# Patient Record
Sex: MALE | Race: BLACK OR AFRICAN AMERICAN | NOT HISPANIC OR LATINO
[De-identification: names, ages, dates, MRNs, and addresses within clinical notes are randomized per-mention and may not be internally consistent; named-entity substitution may affect disease eponyms.]

---

## 2017-02-27 ENCOUNTER — APPOINTMENT (OUTPATIENT)
Dept: ORTHOPEDIC SURGERY | Facility: CLINIC | Age: 62
End: 2017-02-27

## 2017-02-27 VITALS
DIASTOLIC BLOOD PRESSURE: 99 MMHG | BODY MASS INDEX: 28.44 KG/M2 | HEIGHT: 72 IN | WEIGHT: 210 LBS | SYSTOLIC BLOOD PRESSURE: 163 MMHG | HEART RATE: 60 BPM

## 2017-02-27 DIAGNOSIS — Z78.9 OTHER SPECIFIED HEALTH STATUS: ICD-10-CM

## 2017-02-27 RX ORDER — IBUPROFEN 800 MG/1
TABLET, FILM COATED ORAL
Refills: 0 | Status: ACTIVE | COMMUNITY

## 2017-02-27 RX ORDER — ASPIRIN 325 MG/1
TABLET, FILM COATED ORAL
Refills: 0 | Status: ACTIVE | COMMUNITY

## 2017-02-27 RX ORDER — NAPROXEN 500 MG/1
TABLET ORAL
Refills: 0 | Status: ACTIVE | COMMUNITY

## 2017-03-13 ENCOUNTER — APPOINTMENT (OUTPATIENT)
Dept: ORTHOPEDIC SURGERY | Facility: CLINIC | Age: 62
End: 2017-03-13

## 2017-03-13 DIAGNOSIS — S82.832A OTHER FRACTURE OF UPPER AND LOWER END OF LEFT FIBULA, INITIAL ENCOUNTER FOR CLOSED FRACTURE: ICD-10-CM

## 2017-04-11 ENCOUNTER — APPOINTMENT (OUTPATIENT)
Dept: ORTHOPEDIC SURGERY | Facility: CLINIC | Age: 62
End: 2017-04-11

## 2017-05-09 ENCOUNTER — APPOINTMENT (OUTPATIENT)
Dept: ORTHOPEDIC SURGERY | Facility: CLINIC | Age: 62
End: 2017-05-09

## 2017-08-08 ENCOUNTER — APPOINTMENT (OUTPATIENT)
Dept: ORTHOPEDIC SURGERY | Facility: CLINIC | Age: 62
End: 2017-08-08
Payer: COMMERCIAL

## 2017-08-08 PROCEDURE — 99214 OFFICE O/P EST MOD 30 MIN: CPT

## 2017-08-08 PROCEDURE — 73610 X-RAY EXAM OF ANKLE: CPT | Mod: LT

## 2017-08-08 PROCEDURE — 73630 X-RAY EXAM OF FOOT: CPT | Mod: RT

## 2018-02-12 ENCOUNTER — APPOINTMENT (OUTPATIENT)
Dept: ORTHOPEDIC SURGERY | Facility: CLINIC | Age: 63
End: 2018-02-12
Payer: COMMERCIAL

## 2018-02-12 DIAGNOSIS — S82.832D OTHER FRACTURE OF UPPER AND LOWER END OF LEFT FIBULA, SUBSEQUENT ENCOUNTER FOR CLOSED FRACTURE WITH ROUTINE HEALING: ICD-10-CM

## 2018-02-12 PROCEDURE — 73630 X-RAY EXAM OF FOOT: CPT | Mod: RT

## 2018-02-12 PROCEDURE — 99214 OFFICE O/P EST MOD 30 MIN: CPT

## 2018-02-12 PROCEDURE — 73610 X-RAY EXAM OF ANKLE: CPT | Mod: LT

## 2018-03-26 ENCOUNTER — APPOINTMENT (OUTPATIENT)
Dept: ORTHOPEDIC SURGERY | Facility: CLINIC | Age: 63
End: 2018-03-26
Payer: COMMERCIAL

## 2018-04-13 ENCOUNTER — APPOINTMENT (OUTPATIENT)
Dept: ORTHOPEDIC SURGERY | Facility: CLINIC | Age: 63
End: 2018-04-13
Payer: COMMERCIAL

## 2018-04-13 ENCOUNTER — RX RENEWAL (OUTPATIENT)
Age: 63
End: 2018-04-13

## 2018-04-13 PROCEDURE — 99214 OFFICE O/P EST MOD 30 MIN: CPT

## 2018-04-13 PROCEDURE — 73630 X-RAY EXAM OF FOOT: CPT | Mod: RT

## 2018-04-13 RX ORDER — METHYLPREDNISOLONE 4 MG/1
4 TABLET ORAL
Qty: 1 | Refills: 0 | Status: ACTIVE | COMMUNITY
Start: 2018-04-13 | End: 1900-01-01

## 2018-04-23 ENCOUNTER — FORM ENCOUNTER (OUTPATIENT)
Age: 63
End: 2018-04-23

## 2018-04-24 ENCOUNTER — APPOINTMENT (OUTPATIENT)
Dept: MRI IMAGING | Facility: IMAGING CENTER | Age: 63
End: 2018-04-24
Payer: COMMERCIAL

## 2018-04-24 ENCOUNTER — OUTPATIENT (OUTPATIENT)
Dept: OUTPATIENT SERVICES | Facility: HOSPITAL | Age: 63
LOS: 1 days | End: 2018-04-24
Payer: COMMERCIAL

## 2018-04-24 DIAGNOSIS — M20.21 HALLUX RIGIDUS, RIGHT FOOT: ICD-10-CM

## 2018-04-24 DIAGNOSIS — M20.12 HALLUX VALGUS (ACQUIRED), LEFT FOOT: Chronic | ICD-10-CM

## 2018-04-24 PROCEDURE — 73718 MRI LOWER EXTREMITY W/O DYE: CPT

## 2018-04-24 PROCEDURE — 73718 MRI LOWER EXTREMITY W/O DYE: CPT | Mod: 26,RT

## 2018-04-26 ENCOUNTER — APPOINTMENT (OUTPATIENT)
Dept: ORTHOPEDIC SURGERY | Facility: CLINIC | Age: 63
End: 2018-04-26
Payer: COMMERCIAL

## 2018-04-26 PROCEDURE — 99214 OFFICE O/P EST MOD 30 MIN: CPT

## 2018-04-26 RX ORDER — TADALAFIL 10 MG/1
10 TABLET, FILM COATED ORAL
Qty: 12 | Refills: 0 | Status: ACTIVE | COMMUNITY
Start: 2017-12-22

## 2018-04-26 RX ORDER — SILDENAFIL 100 MG/1
100 TABLET, FILM COATED ORAL
Qty: 6 | Refills: 0 | Status: ACTIVE | COMMUNITY
Start: 2017-12-22

## 2018-06-01 ENCOUNTER — RX RENEWAL (OUTPATIENT)
Age: 63
End: 2018-06-01

## 2018-07-24 ENCOUNTER — APPOINTMENT (OUTPATIENT)
Dept: RHEUMATOLOGY | Facility: CLINIC | Age: 63
End: 2018-07-24
Payer: COMMERCIAL

## 2018-07-24 VITALS
RESPIRATION RATE: 16 BRPM | DIASTOLIC BLOOD PRESSURE: 82 MMHG | TEMPERATURE: 98 F | OXYGEN SATURATION: 97 % | WEIGHT: 217 LBS | BODY MASS INDEX: 29.39 KG/M2 | SYSTOLIC BLOOD PRESSURE: 126 MMHG | HEIGHT: 72 IN | HEART RATE: 64 BPM

## 2018-07-24 LAB
BASOPHILS # BLD AUTO: 0.02 K/UL
BASOPHILS NFR BLD AUTO: 0.3 %
EOSINOPHIL # BLD AUTO: 0.1 K/UL
EOSINOPHIL NFR BLD AUTO: 1.3 %
HCT VFR BLD CALC: 44.6 %
HGB BLD-MCNC: 14.9 G/DL
IMM GRANULOCYTES NFR BLD AUTO: 0.5 %
LYMPHOCYTES # BLD AUTO: 1.97 K/UL
LYMPHOCYTES NFR BLD AUTO: 24.6 %
MAN DIFF?: NORMAL
MCHC RBC-ENTMCNC: 33.4 GM/DL
MCHC RBC-ENTMCNC: 34.2 PG
MCV RBC AUTO: 102.3 FL
MONOCYTES # BLD AUTO: 0.56 K/UL
MONOCYTES NFR BLD AUTO: 7 %
NEUTROPHILS # BLD AUTO: 5.31 K/UL
NEUTROPHILS NFR BLD AUTO: 66.3 %
PLATELET # BLD AUTO: 245 K/UL
RBC # BLD: 4.36 M/UL
RBC # FLD: 12.8 %
WBC # FLD AUTO: 8 K/UL

## 2018-07-24 PROCEDURE — 99204 OFFICE O/P NEW MOD 45 MIN: CPT

## 2018-07-25 LAB
ALBUMIN SERPL ELPH-MCNC: 4.5 G/DL
ALP BLD-CCNC: 63 U/L
ALT SERPL-CCNC: 25 U/L
ANION GAP SERPL CALC-SCNC: 14 MMOL/L
AST SERPL-CCNC: 25 U/L
BILIRUB SERPL-MCNC: 0.4 MG/DL
BUN SERPL-MCNC: 13 MG/DL
CALCIUM SERPL-MCNC: 9.5 MG/DL
CCP AB SER IA-ACNC: <8 UNITS
CHLORIDE SERPL-SCNC: 105 MMOL/L
CO2 SERPL-SCNC: 25 MMOL/L
CREAT SERPL-MCNC: 0.97 MG/DL
CRP SERPL-MCNC: 0.25 MG/DL
ERYTHROCYTE [SEDIMENTATION RATE] IN BLOOD BY WESTERGREN METHOD: 12 MM/HR
GLUCOSE SERPL-MCNC: 96 MG/DL
POTASSIUM SERPL-SCNC: 4.5 MMOL/L
PROT SERPL-MCNC: 7 G/DL
RF+CCP IGG SER-IMP: NEGATIVE
RHEUMATOID FACT SER QL: 11 IU/ML
SODIUM SERPL-SCNC: 144 MMOL/L
URATE SERPL-MCNC: 8.1 MG/DL

## 2018-08-01 ENCOUNTER — APPOINTMENT (OUTPATIENT)
Dept: RHEUMATOLOGY | Facility: CLINIC | Age: 63
End: 2018-08-01

## 2018-08-09 ENCOUNTER — APPOINTMENT (OUTPATIENT)
Dept: ORTHOPEDIC SURGERY | Facility: CLINIC | Age: 63
End: 2018-08-09
Payer: COMMERCIAL

## 2018-08-09 DIAGNOSIS — M25.571 PAIN IN RIGHT ANKLE AND JOINTS OF RIGHT FOOT: ICD-10-CM

## 2018-08-09 DIAGNOSIS — M70.61 TROCHANTERIC BURSITIS, RIGHT HIP: ICD-10-CM

## 2018-08-09 DIAGNOSIS — M25.551 PAIN IN RIGHT HIP: ICD-10-CM

## 2018-08-09 PROCEDURE — 99214 OFFICE O/P EST MOD 30 MIN: CPT

## 2018-08-09 RX ORDER — THIAMINE HCL 100 MG
500 TABLET ORAL
Refills: 0 | Status: ACTIVE | COMMUNITY

## 2018-09-20 ENCOUNTER — APPOINTMENT (OUTPATIENT)
Dept: RHEUMATOLOGY | Facility: CLINIC | Age: 63
End: 2018-09-20
Payer: COMMERCIAL

## 2018-09-20 VITALS
SYSTOLIC BLOOD PRESSURE: 115 MMHG | HEART RATE: 63 BPM | WEIGHT: 219 LBS | DIASTOLIC BLOOD PRESSURE: 77 MMHG | HEIGHT: 72 IN | BODY MASS INDEX: 29.66 KG/M2 | TEMPERATURE: 98.2 F | OXYGEN SATURATION: 98 %

## 2018-09-20 DIAGNOSIS — M25.552 PAIN IN RIGHT HIP: ICD-10-CM

## 2018-09-20 DIAGNOSIS — M54.5 LOW BACK PAIN: ICD-10-CM

## 2018-09-20 DIAGNOSIS — M25.551 PAIN IN RIGHT HIP: ICD-10-CM

## 2018-09-20 PROCEDURE — 99214 OFFICE O/P EST MOD 30 MIN: CPT

## 2018-09-21 LAB
ALBUMIN SERPL ELPH-MCNC: 4.5 G/DL
ALP BLD-CCNC: 63 U/L
ALT SERPL-CCNC: 30 U/L
ANION GAP SERPL CALC-SCNC: 14 MMOL/L
AST SERPL-CCNC: 25 U/L
BASOPHILS # BLD AUTO: 0.02 K/UL
BASOPHILS NFR BLD AUTO: 0.2 %
BILIRUB SERPL-MCNC: 0.6 MG/DL
BUN SERPL-MCNC: 11 MG/DL
CALCIUM SERPL-MCNC: 9.9 MG/DL
CHLORIDE SERPL-SCNC: 105 MMOL/L
CO2 SERPL-SCNC: 23 MMOL/L
CREAT SERPL-MCNC: 1.05 MG/DL
EOSINOPHIL # BLD AUTO: 0.08 K/UL
EOSINOPHIL NFR BLD AUTO: 0.9 %
GLUCOSE SERPL-MCNC: 83 MG/DL
HCT VFR BLD CALC: 44.7 %
HGB BLD-MCNC: 14.7 G/DL
IMM GRANULOCYTES NFR BLD AUTO: 0.3 %
LYMPHOCYTES # BLD AUTO: 2.22 K/UL
LYMPHOCYTES NFR BLD AUTO: 25.6 %
MAN DIFF?: NORMAL
MCHC RBC-ENTMCNC: 32.9 GM/DL
MCHC RBC-ENTMCNC: 34.3 PG
MCV RBC AUTO: 104.4 FL
MONOCYTES # BLD AUTO: 0.51 K/UL
MONOCYTES NFR BLD AUTO: 5.9 %
NEUTROPHILS # BLD AUTO: 5.81 K/UL
NEUTROPHILS NFR BLD AUTO: 67.1 %
PLATELET # BLD AUTO: 269 K/UL
POTASSIUM SERPL-SCNC: 4.5 MMOL/L
PROT SERPL-MCNC: 7.2 G/DL
RBC # BLD: 4.28 M/UL
RBC # FLD: 12.5 %
SODIUM SERPL-SCNC: 142 MMOL/L
URATE SERPL-MCNC: 4.3 MG/DL
WBC # FLD AUTO: 8.67 K/UL

## 2018-10-11 ENCOUNTER — RX RENEWAL (OUTPATIENT)
Age: 63
End: 2018-10-11

## 2018-10-18 PROBLEM — M25.551 PAIN OF BOTH HIP JOINTS: Status: ACTIVE | Noted: 2018-09-20

## 2018-10-29 ENCOUNTER — FORM ENCOUNTER (OUTPATIENT)
Age: 63
End: 2018-10-29

## 2018-10-30 ENCOUNTER — APPOINTMENT (OUTPATIENT)
Dept: RADIOLOGY | Facility: CLINIC | Age: 63
End: 2018-10-30
Payer: COMMERCIAL

## 2018-10-30 ENCOUNTER — OUTPATIENT (OUTPATIENT)
Dept: OUTPATIENT SERVICES | Facility: HOSPITAL | Age: 63
LOS: 1 days | End: 2018-10-30
Payer: COMMERCIAL

## 2018-10-30 DIAGNOSIS — M20.12 HALLUX VALGUS (ACQUIRED), LEFT FOOT: Chronic | ICD-10-CM

## 2018-10-30 DIAGNOSIS — Z00.8 ENCOUNTER FOR OTHER GENERAL EXAMINATION: ICD-10-CM

## 2018-10-30 PROCEDURE — 73522 X-RAY EXAM HIPS BI 3-4 VIEWS: CPT | Mod: 26

## 2018-10-30 PROCEDURE — 72110 X-RAY EXAM L-2 SPINE 4/>VWS: CPT | Mod: 26

## 2018-10-30 PROCEDURE — 73110 X-RAY EXAM OF WRIST: CPT | Mod: 26,50

## 2018-10-30 PROCEDURE — 73110 X-RAY EXAM OF WRIST: CPT

## 2018-10-30 PROCEDURE — 73630 X-RAY EXAM OF FOOT: CPT | Mod: 26,50

## 2018-10-30 PROCEDURE — 73562 X-RAY EXAM OF KNEE 3: CPT | Mod: 26,50

## 2018-10-30 PROCEDURE — 73630 X-RAY EXAM OF FOOT: CPT

## 2018-10-30 PROCEDURE — 73130 X-RAY EXAM OF HAND: CPT | Mod: 26,50

## 2018-10-30 PROCEDURE — 72110 X-RAY EXAM L-2 SPINE 4/>VWS: CPT

## 2018-10-30 PROCEDURE — 73130 X-RAY EXAM OF HAND: CPT

## 2018-10-30 PROCEDURE — 73522 X-RAY EXAM HIPS BI 3-4 VIEWS: CPT

## 2018-10-30 PROCEDURE — 73562 X-RAY EXAM OF KNEE 3: CPT

## 2018-11-01 ENCOUNTER — APPOINTMENT (OUTPATIENT)
Dept: ORTHOPEDIC SURGERY | Facility: CLINIC | Age: 63
End: 2018-11-01
Payer: COMMERCIAL

## 2018-11-01 ENCOUNTER — TRANSCRIPTION ENCOUNTER (OUTPATIENT)
Age: 63
End: 2018-11-01

## 2018-11-01 DIAGNOSIS — M25.60 STIFFNESS OF UNSPECIFIED JOINT, NOT ELSEWHERE CLASSIFIED: ICD-10-CM

## 2018-11-01 DIAGNOSIS — M20.21 HALLUX RIGIDUS, RIGHT FOOT: ICD-10-CM

## 2018-11-01 DIAGNOSIS — M25.50 PAIN IN UNSPECIFIED JOINT: ICD-10-CM

## 2018-11-01 PROCEDURE — 99214 OFFICE O/P EST MOD 30 MIN: CPT

## 2018-11-28 ENCOUNTER — APPOINTMENT (OUTPATIENT)
Dept: RHEUMATOLOGY | Facility: CLINIC | Age: 63
End: 2018-11-28
Payer: COMMERCIAL

## 2018-11-28 VITALS
DIASTOLIC BLOOD PRESSURE: 88 MMHG | HEIGHT: 72 IN | OXYGEN SATURATION: 97 % | HEART RATE: 80 BPM | TEMPERATURE: 98.1 F | SYSTOLIC BLOOD PRESSURE: 131 MMHG | WEIGHT: 222 LBS | BODY MASS INDEX: 30.07 KG/M2

## 2018-11-28 DIAGNOSIS — M47.816 SPONDYLOSIS W/OUT MYELOPATHY OR RADICULOPATHY, LUMBAR REGION: ICD-10-CM

## 2018-11-28 DIAGNOSIS — M1A.0710 IDIOPATHIC CHRONIC GOUT, RIGHT ANKLE AND FOOT, W/OUT TOPHUS (TOPHI): ICD-10-CM

## 2018-11-28 DIAGNOSIS — M19.071 PRIMARY OSTEOARTHRITIS, RIGHT ANKLE AND FOOT: ICD-10-CM

## 2018-11-28 PROCEDURE — 99214 OFFICE O/P EST MOD 30 MIN: CPT

## 2019-03-04 ENCOUNTER — APPOINTMENT (OUTPATIENT)
Dept: RHEUMATOLOGY | Facility: CLINIC | Age: 64
End: 2019-03-04

## 2019-03-15 ENCOUNTER — TRANSCRIPTION ENCOUNTER (OUTPATIENT)
Age: 64
End: 2019-03-15

## 2019-03-28 ENCOUNTER — APPOINTMENT (OUTPATIENT)
Dept: RHEUMATOLOGY | Facility: CLINIC | Age: 64
End: 2019-03-28

## 2019-06-03 ENCOUNTER — LABORATORY RESULT (OUTPATIENT)
Age: 64
End: 2019-06-03

## 2019-06-03 ENCOUNTER — APPOINTMENT (OUTPATIENT)
Dept: RHEUMATOLOGY | Facility: CLINIC | Age: 64
End: 2019-06-03
Payer: COMMERCIAL

## 2019-06-03 VITALS
OXYGEN SATURATION: 97 % | BODY MASS INDEX: 29.93 KG/M2 | DIASTOLIC BLOOD PRESSURE: 85 MMHG | HEART RATE: 60 BPM | SYSTOLIC BLOOD PRESSURE: 129 MMHG | HEIGHT: 72 IN | WEIGHT: 221 LBS | TEMPERATURE: 98.1 F

## 2019-06-03 DIAGNOSIS — R20.2 PARESTHESIA OF SKIN: ICD-10-CM

## 2019-06-03 LAB
ALBUMIN SERPL ELPH-MCNC: 4.5 G/DL
ALP BLD-CCNC: 68 U/L
ALT SERPL-CCNC: 24 U/L
ANION GAP SERPL CALC-SCNC: 11 MMOL/L
AST SERPL-CCNC: 22 U/L
BASOPHILS # BLD AUTO: 0.03 K/UL
BASOPHILS NFR BLD AUTO: 0.3 %
BILIRUB SERPL-MCNC: 0.4 MG/DL
BUN SERPL-MCNC: 14 MG/DL
CALCIUM SERPL-MCNC: 9.7 MG/DL
CHLORIDE SERPL-SCNC: 106 MMOL/L
CO2 SERPL-SCNC: 25 MMOL/L
CREAT SERPL-MCNC: 1.13 MG/DL
EOSINOPHIL # BLD AUTO: 0.13 K/UL
EOSINOPHIL NFR BLD AUTO: 1.4 %
GLUCOSE SERPL-MCNC: 86 MG/DL
HCT VFR BLD CALC: 46.2 %
HGB BLD-MCNC: 15 G/DL
IMM GRANULOCYTES NFR BLD AUTO: 0.4 %
LYMPHOCYTES # BLD AUTO: 2.58 K/UL
LYMPHOCYTES NFR BLD AUTO: 28.1 %
MAN DIFF?: NORMAL
MCHC RBC-ENTMCNC: 32.5 GM/DL
MCHC RBC-ENTMCNC: 34.6 PG
MCV RBC AUTO: 106.5 FL
MONOCYTES # BLD AUTO: 0.66 K/UL
MONOCYTES NFR BLD AUTO: 7.2 %
NEUTROPHILS # BLD AUTO: 5.73 K/UL
NEUTROPHILS NFR BLD AUTO: 62.6 %
PLATELET # BLD AUTO: 234 K/UL
POTASSIUM SERPL-SCNC: 4.5 MMOL/L
PROT SERPL-MCNC: 7.1 G/DL
RBC # BLD: 4.34 M/UL
RBC # FLD: 12 %
SODIUM SERPL-SCNC: 142 MMOL/L
URATE SERPL-MCNC: 4.1 MG/DL
WBC # FLD AUTO: 9.17 K/UL

## 2019-06-03 PROCEDURE — 99213 OFFICE O/P EST LOW 20 MIN: CPT

## 2019-06-03 RX ORDER — ALLOPURINOL 300 MG/1
300 TABLET ORAL DAILY
Qty: 30 | Refills: 2 | Status: DISCONTINUED | COMMUNITY
Start: 2018-04-26 | End: 2019-06-03

## 2019-07-23 ENCOUNTER — FORM ENCOUNTER (OUTPATIENT)
Age: 64
End: 2019-07-23

## 2019-07-24 ENCOUNTER — APPOINTMENT (OUTPATIENT)
Dept: MRI IMAGING | Facility: CLINIC | Age: 64
End: 2019-07-24
Payer: COMMERCIAL

## 2019-07-24 ENCOUNTER — OUTPATIENT (OUTPATIENT)
Dept: OUTPATIENT SERVICES | Facility: HOSPITAL | Age: 64
LOS: 1 days | End: 2019-07-24
Payer: COMMERCIAL

## 2019-07-24 DIAGNOSIS — M20.12 HALLUX VALGUS (ACQUIRED), LEFT FOOT: Chronic | ICD-10-CM

## 2019-07-24 DIAGNOSIS — R20.2 PARESTHESIA OF SKIN: ICD-10-CM

## 2019-07-24 PROCEDURE — 72148 MRI LUMBAR SPINE W/O DYE: CPT

## 2019-07-24 PROCEDURE — 72148 MRI LUMBAR SPINE W/O DYE: CPT | Mod: 26

## 2019-07-24 PROCEDURE — 72141 MRI NECK SPINE W/O DYE: CPT | Mod: 26

## 2019-07-24 PROCEDURE — 72141 MRI NECK SPINE W/O DYE: CPT

## 2019-07-29 ENCOUNTER — APPOINTMENT (OUTPATIENT)
Dept: RHEUMATOLOGY | Facility: CLINIC | Age: 64
End: 2019-07-29
Payer: COMMERCIAL

## 2019-07-29 ENCOUNTER — LABORATORY RESULT (OUTPATIENT)
Age: 64
End: 2019-07-29

## 2019-07-29 ENCOUNTER — APPOINTMENT (OUTPATIENT)
Dept: ENDOCRINOLOGY | Facility: CLINIC | Age: 64
End: 2019-07-29
Payer: COMMERCIAL

## 2019-07-29 VITALS
BODY MASS INDEX: 29.84 KG/M2 | SYSTOLIC BLOOD PRESSURE: 132 MMHG | HEART RATE: 62 BPM | OXYGEN SATURATION: 98 % | DIASTOLIC BLOOD PRESSURE: 92 MMHG | TEMPERATURE: 98.1 F | WEIGHT: 220 LBS

## 2019-07-29 VITALS — BODY MASS INDEX: 29.63 KG/M2 | HEIGHT: 71.25 IN | WEIGHT: 214 LBS

## 2019-07-29 DIAGNOSIS — Z13.820 ENCOUNTER FOR SCREENING FOR OSTEOPOROSIS: ICD-10-CM

## 2019-07-29 PROCEDURE — 99214 OFFICE O/P EST MOD 30 MIN: CPT

## 2019-07-29 PROCEDURE — 77080 DXA BONE DENSITY AXIAL: CPT

## 2019-08-01 PROBLEM — Z13.820 OSTEOPOROSIS SCREENING: Status: ACTIVE | Noted: 2019-08-01

## 2019-08-01 LAB
ALBUMIN MFR SERPL ELPH: 63.3 %
ALBUMIN SERPL-MCNC: 4.6 G/DL
ALBUMIN/GLOB SERPL: 1.7 RATIO
ALBUPE: 23.6 %
ALBUPE: 23.6 %
ALPHA1 GLOB MFR SERPL ELPH: 3.6 %
ALPHA1 GLOB SERPL ELPH-MCNC: 0.3 G/DL
ALPHA1UPE: 35.7 %
ALPHA1UPE: 35.7 %
ALPHA2 GLOB MFR SERPL ELPH: 7.4 %
ALPHA2 GLOB SERPL ELPH-MCNC: 0.5 G/DL
ALPHA2UPE: 19.2 %
ALPHA2UPE: 19.2 %
B-GLOBULIN MFR SERPL ELPH: 11.7 %
B-GLOBULIN SERPL ELPH-MCNC: 0.9 G/DL
BETAUPE: 9.9 %
BETAUPE: 9.9 %
CREAT 24H UR-MCNC: NORMAL G/24 H
CREATININE UR (MAYO): 101 MG/DL
GAMMA GLOB FLD ELPH-MCNC: 1 G/DL
GAMMA GLOB MFR SERPL ELPH: 14 %
GAMMAUPE: 11.6 %
GAMMAUPE: 11.6 %
IGA 24H UR QL IFE: NORMAL
IGA 24H UR QL IFE: NORMAL
INTERPRETATION SERPL IEP-IMP: NORMAL
KAPPA LC 24H UR QL: NORMAL
KAPPA LC 24H UR QL: NORMAL
M PROTEIN SPEC IFE-MCNC: NORMAL
PROT PATTERN 24H UR ELPH-IMP: NORMAL
PROT PATTERN 24H UR ELPH-IMP: NORMAL
PROT SERPL-MCNC: 7.3 G/DL
PROT SERPL-MCNC: 7.3 G/DL
PROT UR-MCNC: 7 MG/DL
SPECIMEN VOL 24H UR: NORMAL ML

## 2019-08-30 ENCOUNTER — RX RENEWAL (OUTPATIENT)
Age: 64
End: 2019-08-30

## 2019-12-03 ENCOUNTER — APPOINTMENT (OUTPATIENT)
Dept: RHEUMATOLOGY | Facility: CLINIC | Age: 64
End: 2019-12-03
Payer: COMMERCIAL

## 2019-12-03 VITALS
HEART RATE: 73 BPM | DIASTOLIC BLOOD PRESSURE: 85 MMHG | WEIGHT: 221 LBS | BODY MASS INDEX: 30.61 KG/M2 | SYSTOLIC BLOOD PRESSURE: 128 MMHG | OXYGEN SATURATION: 98 % | TEMPERATURE: 98.2 F

## 2019-12-03 DIAGNOSIS — M19.049 PRIMARY OSTEOARTHRITIS, UNSPECIFIED HAND: ICD-10-CM

## 2019-12-03 DIAGNOSIS — M54.41 LUMBAGO WITH SCIATICA, RIGHT SIDE: ICD-10-CM

## 2019-12-03 PROCEDURE — 99214 OFFICE O/P EST MOD 30 MIN: CPT

## 2020-03-02 ENCOUNTER — APPOINTMENT (OUTPATIENT)
Dept: RHEUMATOLOGY | Facility: CLINIC | Age: 65
End: 2020-03-02
Payer: COMMERCIAL

## 2020-03-02 VITALS
DIASTOLIC BLOOD PRESSURE: 89 MMHG | TEMPERATURE: 98.8 F | BODY MASS INDEX: 30.47 KG/M2 | WEIGHT: 220 LBS | HEART RATE: 62 BPM | RESPIRATION RATE: 16 BRPM | OXYGEN SATURATION: 97 % | SYSTOLIC BLOOD PRESSURE: 141 MMHG

## 2020-03-02 DIAGNOSIS — M51.9 UNSPECIFIED THORACIC, THORACOLUMBAR AND LUMBOSACRAL INTERVERTEBRAL DISC DISORDER: ICD-10-CM

## 2020-03-02 DIAGNOSIS — S12.490A: ICD-10-CM

## 2020-03-02 DIAGNOSIS — M50.90 CERVICAL DISC DISORDER, UNSPECIFIED, UNSPECIFIED CERVICAL REGION: ICD-10-CM

## 2020-03-02 DIAGNOSIS — R20.2 PARESTHESIA OF SKIN: ICD-10-CM

## 2020-03-02 PROCEDURE — 99213 OFFICE O/P EST LOW 20 MIN: CPT

## 2020-03-04 LAB
ALBUMIN SERPL ELPH-MCNC: 4.4 G/DL
ALP BLD-CCNC: 64 U/L
ALT SERPL-CCNC: 22 U/L
ANION GAP SERPL CALC-SCNC: 11 MMOL/L
AST SERPL-CCNC: 22 U/L
BASOPHILS # BLD AUTO: 0.02 K/UL
BASOPHILS NFR BLD AUTO: 0.3 %
BILIRUB SERPL-MCNC: 0.6 MG/DL
BUN SERPL-MCNC: 13 MG/DL
CALCIUM SERPL-MCNC: 9.7 MG/DL
CHLORIDE SERPL-SCNC: 108 MMOL/L
CO2 SERPL-SCNC: 24 MMOL/L
CREAT SERPL-MCNC: 1.18 MG/DL
EOSINOPHIL # BLD AUTO: 0.09 K/UL
EOSINOPHIL NFR BLD AUTO: 1.2 %
GLUCOSE SERPL-MCNC: 92 MG/DL
HCT VFR BLD CALC: 47.7 %
HGB BLD-MCNC: 15.2 G/DL
IMM GRANULOCYTES NFR BLD AUTO: 0.4 %
LYMPHOCYTES # BLD AUTO: 2.15 K/UL
LYMPHOCYTES NFR BLD AUTO: 29.1 %
MAN DIFF?: NORMAL
MCHC RBC-ENTMCNC: 31.9 GM/DL
MCHC RBC-ENTMCNC: 33.6 PG
MCV RBC AUTO: 105.5 FL
MONOCYTES # BLD AUTO: 0.62 K/UL
MONOCYTES NFR BLD AUTO: 8.4 %
NEUTROPHILS # BLD AUTO: 4.49 K/UL
NEUTROPHILS NFR BLD AUTO: 60.6 %
PLATELET # BLD AUTO: 239 K/UL
POTASSIUM SERPL-SCNC: 4.7 MMOL/L
PROT SERPL-MCNC: 6.6 G/DL
RBC # BLD: 4.52 M/UL
RBC # FLD: 11.8 %
SODIUM SERPL-SCNC: 142 MMOL/L
URATE SERPL-MCNC: 7.3 MG/DL
WBC # FLD AUTO: 7.4 K/UL

## 2020-03-06 PROBLEM — R20.2 PARESTHESIA: Status: ACTIVE | Noted: 2019-12-03

## 2020-03-06 PROBLEM — M50.90 CERVICAL DISC DISEASE: Status: ACTIVE | Noted: 2019-12-03

## 2020-03-06 PROBLEM — S12.490A: Status: ACTIVE | Noted: 2019-07-29

## 2020-03-06 PROBLEM — M51.9 LUMBAR DISC DISEASE: Status: ACTIVE | Noted: 2018-11-28

## 2020-03-09 ENCOUNTER — RX RENEWAL (OUTPATIENT)
Age: 65
End: 2020-03-09

## 2021-05-03 ENCOUNTER — APPOINTMENT (OUTPATIENT)
Dept: RHEUMATOLOGY | Facility: CLINIC | Age: 66
End: 2021-05-03
Payer: COMMERCIAL

## 2021-05-03 ENCOUNTER — LABORATORY RESULT (OUTPATIENT)
Age: 66
End: 2021-05-03

## 2021-05-03 VITALS
DIASTOLIC BLOOD PRESSURE: 94 MMHG | HEART RATE: 60 BPM | HEIGHT: 71.25 IN | BODY MASS INDEX: 29.07 KG/M2 | SYSTOLIC BLOOD PRESSURE: 149 MMHG | WEIGHT: 210 LBS | OXYGEN SATURATION: 97 % | TEMPERATURE: 97.5 F | RESPIRATION RATE: 16 BRPM

## 2021-05-03 DIAGNOSIS — R20.0 ANESTHESIA OF SKIN: ICD-10-CM

## 2021-05-03 DIAGNOSIS — M1A.9XX1 CHRONIC GOUT, UNSPECIFIED, WITH TOPHUS (TOPHI): ICD-10-CM

## 2021-05-03 DIAGNOSIS — R71.8 OTHER ABNORMALITY OF RED BLOOD CELLS: ICD-10-CM

## 2021-05-03 PROCEDURE — 99213 OFFICE O/P EST LOW 20 MIN: CPT

## 2021-05-03 PROCEDURE — 99072 ADDL SUPL MATRL&STAF TM PHE: CPT

## 2021-05-04 DIAGNOSIS — M10.9 GOUT, UNSPECIFIED: ICD-10-CM

## 2021-05-04 LAB
ALBUMIN SERPL ELPH-MCNC: 4.4 G/DL
ALP BLD-CCNC: 76 U/L
ALT SERPL-CCNC: 30 U/L
ANION GAP SERPL CALC-SCNC: 11 MMOL/L
AST SERPL-CCNC: 26 U/L
BASOPHILS # BLD AUTO: 0.03 K/UL
BASOPHILS NFR BLD AUTO: 0.4 %
BILIRUB SERPL-MCNC: 0.6 MG/DL
BUN SERPL-MCNC: 12 MG/DL
CALCIUM SERPL-MCNC: 9.8 MG/DL
CHLORIDE SERPL-SCNC: 108 MMOL/L
CO2 SERPL-SCNC: 23 MMOL/L
CREAT SERPL-MCNC: 1.09 MG/DL
EOSINOPHIL # BLD AUTO: 0.17 K/UL
EOSINOPHIL NFR BLD AUTO: 2.2 %
HCT VFR BLD CALC: 45.8 %
HGB BLD-MCNC: 15 G/DL
IMM GRANULOCYTES NFR BLD AUTO: 0.4 %
LYMPHOCYTES # BLD AUTO: 2.47 K/UL
LYMPHOCYTES NFR BLD AUTO: 31.5 %
MAN DIFF?: NORMAL
MCHC RBC-ENTMCNC: 32.8 GM/DL
MCHC RBC-ENTMCNC: 35.1 PG
MCV RBC AUTO: 107.3 FL
MONOCYTES # BLD AUTO: 0.62 K/UL
MONOCYTES NFR BLD AUTO: 7.9 %
NEUTROPHILS # BLD AUTO: 4.52 K/UL
NEUTROPHILS NFR BLD AUTO: 57.6 %
PLATELET # BLD AUTO: 243 K/UL
POTASSIUM SERPL-SCNC: 4.7 MMOL/L
PROT SERPL-MCNC: 6.9 G/DL
RBC # BLD: 4.27 M/UL
RBC # FLD: 12.3 %
SODIUM SERPL-SCNC: 142 MMOL/L
URATE SERPL-MCNC: 8 MG/DL
WBC # FLD AUTO: 7.84 K/UL

## 2021-05-04 RX ORDER — COLCHICINE 0.6 MG/1
0.6 TABLET ORAL
Qty: 30 | Refills: 2 | Status: ACTIVE | COMMUNITY
Start: 2018-07-24 | End: 1900-01-01

## 2021-05-07 RX ORDER — ALLOPURINOL 300 MG/1
300 TABLET ORAL DAILY
Qty: 90 | Refills: 1 | Status: ACTIVE | COMMUNITY
Start: 2018-07-24 | End: 1900-01-01

## 2021-06-15 LAB
ALBUMIN SERPL ELPH-MCNC: 4.4 G/DL
ALP BLD-CCNC: 79 U/L
ALT SERPL-CCNC: 29 U/L
ANION GAP SERPL CALC-SCNC: 17 MMOL/L
AST SERPL-CCNC: 26 U/L
BASOPHILS # BLD AUTO: 0.04 K/UL
BASOPHILS NFR BLD AUTO: 0.5 %
BILIRUB SERPL-MCNC: 0.6 MG/DL
BUN SERPL-MCNC: 10 MG/DL
CALCIUM SERPL-MCNC: 9.8 MG/DL
CHLORIDE SERPL-SCNC: 105 MMOL/L
CO2 SERPL-SCNC: 20 MMOL/L
CREAT SERPL-MCNC: 1.14 MG/DL
EOSINOPHIL # BLD AUTO: 0.12 K/UL
EOSINOPHIL NFR BLD AUTO: 1.5 %
HCT VFR BLD CALC: 46.7 %
HGB BLD-MCNC: 15.4 G/DL
IMM GRANULOCYTES NFR BLD AUTO: 0.5 %
LYMPHOCYTES # BLD AUTO: 2.73 K/UL
LYMPHOCYTES NFR BLD AUTO: 33.1 %
MAN DIFF?: NORMAL
MCHC RBC-ENTMCNC: 33 GM/DL
MCHC RBC-ENTMCNC: 34.8 PG
MCV RBC AUTO: 105.7 FL
MONOCYTES # BLD AUTO: 0.58 K/UL
MONOCYTES NFR BLD AUTO: 7 %
NEUTROPHILS # BLD AUTO: 4.74 K/UL
NEUTROPHILS NFR BLD AUTO: 57.4 %
PLATELET # BLD AUTO: 231 K/UL
POTASSIUM SERPL-SCNC: 4.2 MMOL/L
PROT SERPL-MCNC: 7 G/DL
RBC # BLD: 4.42 M/UL
RBC # FLD: 11.9 %
SODIUM SERPL-SCNC: 142 MMOL/L
URATE SERPL-MCNC: 4.3 MG/DL
WBC # FLD AUTO: 8.25 K/UL

## 2024-11-01 ENCOUNTER — INPATIENT (INPATIENT)
Facility: HOSPITAL | Age: 69
LOS: 5 days | Discharge: HOME HEALTH | DRG: 282 | End: 2024-11-07
Attending: STUDENT IN AN ORGANIZED HEALTH CARE EDUCATION/TRAINING PROGRAM | Admitting: HOSPITALIST
Payer: MEDICARE

## 2024-11-01 VITALS
HEIGHT: 72 IN | WEIGHT: 214.95 LBS | TEMPERATURE: 97 F | DIASTOLIC BLOOD PRESSURE: 86 MMHG | HEART RATE: 75 BPM | OXYGEN SATURATION: 100 % | RESPIRATION RATE: 18 BRPM | SYSTOLIC BLOOD PRESSURE: 138 MMHG

## 2024-11-01 DIAGNOSIS — M20.12 HALLUX VALGUS (ACQUIRED), LEFT FOOT: Chronic | ICD-10-CM

## 2024-11-01 LAB
ALBUMIN SERPL ELPH-MCNC: 3.7 G/DL — SIGNIFICANT CHANGE UP (ref 3.3–5)
ALP SERPL-CCNC: 77 U/L — SIGNIFICANT CHANGE UP (ref 40–120)
ALT FLD-CCNC: 35 U/L — SIGNIFICANT CHANGE UP (ref 10–45)
ANION GAP SERPL CALC-SCNC: 11 MMOL/L — SIGNIFICANT CHANGE UP (ref 5–17)
APTT BLD: 29.4 SEC — SIGNIFICANT CHANGE UP (ref 24.5–35.6)
AST SERPL-CCNC: 44 U/L — HIGH (ref 10–40)
BASOPHILS # BLD AUTO: 0.04 K/UL — SIGNIFICANT CHANGE UP (ref 0–0.2)
BASOPHILS NFR BLD AUTO: 0.4 % — SIGNIFICANT CHANGE UP (ref 0–2)
BILIRUB SERPL-MCNC: 0.6 MG/DL — SIGNIFICANT CHANGE UP (ref 0.2–1.2)
BUN SERPL-MCNC: 12 MG/DL — SIGNIFICANT CHANGE UP (ref 7–23)
CALCIUM SERPL-MCNC: 8.8 MG/DL — SIGNIFICANT CHANGE UP (ref 8.4–10.5)
CHLORIDE SERPL-SCNC: 107 MMOL/L — SIGNIFICANT CHANGE UP (ref 96–108)
CO2 SERPL-SCNC: 20 MMOL/L — LOW (ref 22–31)
CREAT SERPL-MCNC: 1.02 MG/DL — SIGNIFICANT CHANGE UP (ref 0.5–1.3)
EGFR: 81 ML/MIN/1.73M2 — SIGNIFICANT CHANGE UP
EOSINOPHIL # BLD AUTO: 0.26 K/UL — SIGNIFICANT CHANGE UP (ref 0–0.5)
EOSINOPHIL NFR BLD AUTO: 2.4 % — SIGNIFICANT CHANGE UP (ref 0–6)
GAS PNL BLDV: SIGNIFICANT CHANGE UP
GLUCOSE SERPL-MCNC: 95 MG/DL — SIGNIFICANT CHANGE UP (ref 70–99)
HCT VFR BLD CALC: 41.8 % — SIGNIFICANT CHANGE UP (ref 39–50)
HGB BLD-MCNC: 13.8 G/DL — SIGNIFICANT CHANGE UP (ref 13–17)
IMM GRANULOCYTES NFR BLD AUTO: 0.5 % — SIGNIFICANT CHANGE UP (ref 0–0.9)
INR BLD: 1.01 RATIO — SIGNIFICANT CHANGE UP (ref 0.85–1.16)
LYMPHOCYTES # BLD AUTO: 2.73 K/UL — SIGNIFICANT CHANGE UP (ref 1–3.3)
LYMPHOCYTES # BLD AUTO: 24.9 % — SIGNIFICANT CHANGE UP (ref 13–44)
MAGNESIUM SERPL-MCNC: 2.2 MG/DL — SIGNIFICANT CHANGE UP (ref 1.6–2.6)
MCHC RBC-ENTMCNC: 33 G/DL — SIGNIFICANT CHANGE UP (ref 32–36)
MCHC RBC-ENTMCNC: 33.5 PG — SIGNIFICANT CHANGE UP (ref 27–34)
MCV RBC AUTO: 101.5 FL — HIGH (ref 80–100)
MONOCYTES # BLD AUTO: 1.12 K/UL — HIGH (ref 0–0.9)
MONOCYTES NFR BLD AUTO: 10.2 % — SIGNIFICANT CHANGE UP (ref 2–14)
NEUTROPHILS # BLD AUTO: 6.76 K/UL — SIGNIFICANT CHANGE UP (ref 1.8–7.4)
NEUTROPHILS NFR BLD AUTO: 61.6 % — SIGNIFICANT CHANGE UP (ref 43–77)
NRBC # BLD: 0 /100 WBCS — SIGNIFICANT CHANGE UP (ref 0–0)
NT-PROBNP SERPL-SCNC: 756 PG/ML — HIGH (ref 0–300)
PLATELET # BLD AUTO: 155 K/UL — SIGNIFICANT CHANGE UP (ref 150–400)
POTASSIUM SERPL-MCNC: 4.5 MMOL/L — SIGNIFICANT CHANGE UP (ref 3.5–5.3)
POTASSIUM SERPL-SCNC: 4.5 MMOL/L — SIGNIFICANT CHANGE UP (ref 3.5–5.3)
PROT SERPL-MCNC: 6.9 G/DL — SIGNIFICANT CHANGE UP (ref 6–8.3)
PROTHROM AB SERPL-ACNC: 11.6 SEC — SIGNIFICANT CHANGE UP (ref 9.9–13.4)
RBC # BLD: 4.12 M/UL — LOW (ref 4.2–5.8)
RBC # FLD: 11.6 % — SIGNIFICANT CHANGE UP (ref 10.3–14.5)
SODIUM SERPL-SCNC: 138 MMOL/L — SIGNIFICANT CHANGE UP (ref 135–145)
TROPONIN T, HIGH SENSITIVITY RESULT: 160 NG/L — HIGH (ref 0–51)
WBC # BLD: 10.96 K/UL — HIGH (ref 3.8–10.5)
WBC # FLD AUTO: 10.96 K/UL — HIGH (ref 3.8–10.5)

## 2024-11-01 PROCEDURE — 71045 X-RAY EXAM CHEST 1 VIEW: CPT | Mod: 26

## 2024-11-01 PROCEDURE — 99291 CRITICAL CARE FIRST HOUR: CPT

## 2024-11-01 RX ORDER — HEPARIN SODIUM 10000 [USP'U]/ML
INJECTION INTRAVENOUS; SUBCUTANEOUS
Qty: 25000 | Refills: 0 | Status: DISCONTINUED | OUTPATIENT
Start: 2024-11-01 | End: 2024-11-03

## 2024-11-01 RX ORDER — HEPARIN SODIUM 10000 [USP'U]/ML
6000 INJECTION INTRAVENOUS; SUBCUTANEOUS EVERY 6 HOURS
Refills: 0 | Status: DISCONTINUED | OUTPATIENT
Start: 2024-11-01 | End: 2024-11-01

## 2024-11-01 RX ORDER — HEPARIN SODIUM 10000 [USP'U]/ML
6000 INJECTION INTRAVENOUS; SUBCUTANEOUS EVERY 6 HOURS
Refills: 0 | Status: DISCONTINUED | OUTPATIENT
Start: 2024-11-01 | End: 2024-11-03

## 2024-11-01 RX ORDER — HEPARIN SODIUM 10000 [USP'U]/ML
INJECTION INTRAVENOUS; SUBCUTANEOUS
Qty: 25000 | Refills: 0 | Status: DISCONTINUED | OUTPATIENT
Start: 2024-11-01 | End: 2024-11-01

## 2024-11-01 NOTE — ED PROVIDER NOTE - PROGRESS NOTE DETAILS
cardiology consulted and ekg teams to fellow Attending Parrish Tamayo:  Patient signed out to me here as a NSTEMI transfer [facility] with original presentation for malaise.  Having dizziness after meals.  First troponin elevated at 160s.  .  Pending CT head given postprandial syncopal symptoms.  Patient is already maintained on heparin drip.  Evaluated cardiology.  Pending CT head for admission.

## 2024-11-01 NOTE — ED PROVIDER NOTE - ATTENDING CONTRIBUTION TO CARE
Chief Complaint:    - Generalized malaise, dizziness after meals, and shortness of breath upon exertion     HPI:    - The patient, a 69-year-old man, was transferred from another hospital where he presented with generalized malaise. He reports experiencing dizziness after meals for a duration of approximately three weeks, and labored breathing upon exertion for a period of at least a month. He also mentions transient episodes of diarrhea. Interestingly, he mentioned an incident where he felt dizzy and lightheaded after changing from a sitting to standing position. His wife witnessed him in an altered state of consciousness and he had a bowel movement on himself. The patient denies any seizures, recent falls, or changes in cognitive function. There was no associated chest pain, though he reported a burning sensation in the chest region. Upon direct questioning, he described shortness of breath with exertion such as climbing stairs, although he noted that this was not a problem when he was engaged in more strenuous physical activity such as fieldwork in the recent past.     Past Medical History:    -  Gout, Glaucoma, Hernia, Vasectomy     Past Surgical History:    -  Procedure to fix a hernia with mesh, Vasectomy     Medications:    -  Eye drops for Glaucoma; has been prescribed Allopurinol in the past for gout flare ups     Review of Systems:    -  Cardiovascular: complains of shortness of breath upon exertion, and burning sensation in the chest. GI: complaints of dizziness post-meals, episodes of diarrhea; Neurological: witnessed transient alteration in consciousness with a stressor, but no history of seizures; Respiratory: No complaints     Physical Examination:    -  Not provided in the conversation     Labs and Studies:    -  Chest X-ray and CT of chest from the previous hospital were both reported to be unremarkable. Initial Troponin level was 30, which later increased to 132. EKG showed T-wave inversion from V1 to V4     Medical Decision Making:    -  The patient's presentation and key findings of post-prandial dizziness, elevated troponin levels, and the witnessed vasovagal episode raise the possibility of a complex interplay of gastrointestinal and cardiovascular factors causing his non-specific symptoms. Based on the chest pain, elevated troponin, and EKG changes, a diagnosis of Paroxysmal Atrial Fibrillation or NSTEMI has to be considered. It can cause low cardiac output states which may have led to his episode of altered consciousness. We will admit the patient for further evaluation, continuous EKG monitoring and cardiology consultation. It's also necessary to rule out neurological causes of the transient altered consciousness, necessitating a CT scan of the brain. Furthermore, the new information of post-prandial syncope raises the possibility of splanchnic steal syndrome. Correlation with heart catheterization findings will help in determining the next steps in the management of this patient. EKG here 71bpm, , qtc 415, TWI V2,3,4     Impression:    -  Impressions include Paroxysmal Atrial Fibrillation, NSTEMI, vasovagal syncope, splanchnic steal syndrome, other dysrhythmia

## 2024-11-01 NOTE — ED PROVIDER NOTE - CLINICAL SUMMARY MEDICAL DECISION MAKING FREE TEXT BOX
Chief Complaint:    - Generalized malaise, dizziness after meals, and shortness of breath upon exertion     HPI:    - The patient, a 69-year-old man, was transferred from another hospital where he presented with generalized malaise. He reports experiencing dizziness after meals for a duration of approximately three weeks, and labored breathing upon exertion for a period of at least a month. He also mentions transient episodes of diarrhea. Interestingly, he mentioned an incident where he felt dizzy and lightheaded after changing from a sitting to standing position. His wife witnessed him in an altered state of consciousness and he had a bowel movement on himself. The patient denies any seizures, recent falls, or changes in cognitive function. There was no associated chest pain, though he reported a burning sensation in the chest region. Upon direct questioning, he described shortness of breath with exertion such as climbing stairs, although he noted that this was not a problem when he was engaged in more strenuous physical activity such as fieldwork in the recent past.     Past Medical History:    -  Gout, Glaucoma, Hernia, Vasectomy     Past Surgical History:    -  Procedure to fix a hernia with mesh, Vasectomy     Medications:    -  Eye drops for Glaucoma; has been prescribed Allopurinol in the past for gout flare ups     Review of Systems:    -  Cardiovascular: complains of shortness of breath upon exertion, and burning sensation in the chest. GI: complaints of dizziness post-meals, episodes of diarrhea; Neurological: witnessed transient alteration in consciousness with a stressor, but no history of seizures; Respiratory: No complaints     Physical Examination:    -  Not provided in the conversation     Labs and Studies:    -  Chest X-ray and CT of chest from the previous hospital were both reported to be unremarkable. Initial Troponin level was 30, which later increased to 132. EKG showed T-wave inversion from V1 to V4     Medical Decision Making:    -  The patient's presentation and key findings of post-prandial dizziness, elevated troponin levels, and the witnessed vasovagal episode raise the possibility of a complex interplay of gastrointestinal and cardiovascular factors causing his non-specific symptoms. Based on the chest pain, elevated troponin, and EKG changes, a diagnosis of Paroxysmal Atrial Fibrillation or NSTEMI has to be considered. It can cause low cardiac output states which may have led to his episode of altered consciousness. We will admit the patient for further evaluation, continuous EKG monitoring and cardiology consultation. It's also necessary to rule out neurological causes of the transient altered consciousness, necessitating a CT scan of the brain. Furthermore, the new information of post-prandial syncope raises the possibility of splanchnic steal syndrome. Correlation with heart catheterization findings will help in determining the next steps in the management of this patient.     Impression:    -  Impressions include Paroxysmal Atrial Fibrillation, NSTEMI, vasovagal syncope, splanchnic steal syndrome, other dysrhythmia

## 2024-11-01 NOTE — ED ADULT NURSE NOTE - NSFALLUNIVINTERV_ED_ALL_ED
Bed/Stretcher in lowest position, wheels locked, appropriate side rails in place/Call bell, personal items and telephone in reach/Instruct patient to call for assistance before getting out of bed/chair/stretcher/Non-slip footwear applied when patient is off stretcher/[place] to call system/Physically safe environment - no spills, clutter or unnecessary equipment/Purposeful proactive rounding/Room/bathroom lighting operational, light cord in reach

## 2024-11-01 NOTE — ED ADULT NURSE NOTE - OBJECTIVE STATEMENT
70 y/o male complaining of SOB, HOOKS, x 1 day. Pt is a&Ox4, breathing spontaneously speaking in full sentences with pmh of gout, glaucoma, hernia and bunionectomy presents to the ED for evaluation of SOb and worsening HOOKS. Pt endorsing severe SOB and HOOKS while walking. Pt endorsing general malaise and not feeling well, occasional episodes of diarrhea and dizziness after meals. states he has been dizzy especially after Bowel movements. SOB and HOOKS he states can be with any type of walking or standing too quickly. Pt states he was able to put a fence up a few weeks ago without issue. Pt denies chest pain, recent falls, N/V/D or previous cardiac problems prior to this. Pt lung sounds clear and equal bilat abd  soft nontender nondistended x4 quadrants, negative for peripheral edema, negative for peripheral edema, placed on cardiac monitor, NSR, placed in patient gown bed in lowest position to maintain safety.

## 2024-11-01 NOTE — ED PROVIDER NOTE - PHYSICAL EXAMINATION
ncat, non-tachycardic, non-tachypneic, mildly diaphoretic, trachea midline, no gross deformity of extremities, no asymmetry, no appreciable edema, CN 2-12 grossly intact, normal coordination

## 2024-11-02 ENCOUNTER — RESULT REVIEW (OUTPATIENT)
Age: 69
End: 2024-11-02

## 2024-11-02 DIAGNOSIS — I21.4 NON-ST ELEVATION (NSTEMI) MYOCARDIAL INFARCTION: ICD-10-CM

## 2024-11-02 DIAGNOSIS — H40.9 UNSPECIFIED GLAUCOMA: ICD-10-CM

## 2024-11-02 DIAGNOSIS — Z29.9 ENCOUNTER FOR PROPHYLACTIC MEASURES, UNSPECIFIED: ICD-10-CM

## 2024-11-02 DIAGNOSIS — R53.81 OTHER MALAISE: ICD-10-CM

## 2024-11-02 LAB
A1C WITH ESTIMATED AVERAGE GLUCOSE RESULT: 5.2 % — SIGNIFICANT CHANGE UP (ref 4–5.6)
APPEARANCE UR: CLEAR — SIGNIFICANT CHANGE UP
APTT BLD: 45.7 SEC — HIGH (ref 24.5–35.6)
APTT BLD: 53.5 SEC — HIGH (ref 24.5–35.6)
APTT BLD: 63.4 SEC — HIGH (ref 24.5–35.6)
BILIRUB UR-MCNC: NEGATIVE — SIGNIFICANT CHANGE UP
BLD GP AB SCN SERPL QL: NEGATIVE — SIGNIFICANT CHANGE UP
CHOLEST SERPL-MCNC: 178 MG/DL — SIGNIFICANT CHANGE UP
CK MB BLD-MCNC: 3.7 % — HIGH (ref 0–3.5)
CK MB BLD-MCNC: 4.8 % — HIGH (ref 0–3.5)
CK MB CFR SERPL CALC: 5.8 NG/ML — SIGNIFICANT CHANGE UP (ref 0–6.7)
CK MB CFR SERPL CALC: 6.9 NG/ML — HIGH (ref 0–6.7)
CK SERPL-CCNC: 121 U/L — SIGNIFICANT CHANGE UP (ref 30–200)
CK SERPL-CCNC: 186 U/L — SIGNIFICANT CHANGE UP (ref 30–200)
COLOR SPEC: YELLOW — SIGNIFICANT CHANGE UP
DIFF PNL FLD: NEGATIVE — SIGNIFICANT CHANGE UP
ESTIMATED AVERAGE GLUCOSE: 103 MG/DL — SIGNIFICANT CHANGE UP (ref 68–114)
GAS PNL BLDV: SIGNIFICANT CHANGE UP
GLUCOSE UR QL: NEGATIVE MG/DL — SIGNIFICANT CHANGE UP
HCT VFR BLD CALC: 41.6 % — SIGNIFICANT CHANGE UP (ref 39–50)
HDLC SERPL-MCNC: 46 MG/DL — SIGNIFICANT CHANGE UP
HGB BLD-MCNC: 13.6 G/DL — SIGNIFICANT CHANGE UP (ref 13–17)
KETONES UR-MCNC: ABNORMAL MG/DL
LACTATE SERPL-SCNC: 1.5 MMOL/L — SIGNIFICANT CHANGE UP (ref 0.5–2)
LEUKOCYTE ESTERASE UR-ACNC: NEGATIVE — SIGNIFICANT CHANGE UP
LIPID PNL WITH DIRECT LDL SERPL: 119 MG/DL — HIGH
MCHC RBC-ENTMCNC: 32.7 G/DL — SIGNIFICANT CHANGE UP (ref 32–36)
MCHC RBC-ENTMCNC: 34 PG — SIGNIFICANT CHANGE UP (ref 27–34)
MCV RBC AUTO: 104 FL — HIGH (ref 80–100)
NITRITE UR-MCNC: NEGATIVE — SIGNIFICANT CHANGE UP
NON HDL CHOLESTEROL: 132 MG/DL — HIGH
NRBC # BLD: 0 /100 WBCS — SIGNIFICANT CHANGE UP (ref 0–0)
NT-PROBNP SERPL-SCNC: 2295 PG/ML — HIGH (ref 0–300)
PH UR: 6 — SIGNIFICANT CHANGE UP (ref 5–8)
PLATELET # BLD AUTO: 157 K/UL — SIGNIFICANT CHANGE UP (ref 150–400)
PROT UR-MCNC: NEGATIVE MG/DL — SIGNIFICANT CHANGE UP
RBC # BLD: 4 M/UL — LOW (ref 4.2–5.8)
RBC # FLD: 11.7 % — SIGNIFICANT CHANGE UP (ref 10.3–14.5)
RH IG SCN BLD-IMP: POSITIVE — SIGNIFICANT CHANGE UP
SP GR SPEC: 1.01 — SIGNIFICANT CHANGE UP (ref 1–1.03)
TRIGL SERPL-MCNC: 70 MG/DL — SIGNIFICANT CHANGE UP
TROPONIN T, HIGH SENSITIVITY RESULT: 48 NG/L — SIGNIFICANT CHANGE UP (ref 0–51)
TROPONIN T, HIGH SENSITIVITY RESULT: 58 NG/L — HIGH (ref 0–51)
TROPONIN T, HIGH SENSITIVITY RESULT: 74 NG/L — HIGH (ref 0–51)
TSH SERPL-MCNC: 0.8 UIU/ML — SIGNIFICANT CHANGE UP (ref 0.27–4.2)
UROBILINOGEN FLD QL: 0.2 MG/DL — SIGNIFICANT CHANGE UP (ref 0.2–1)
WBC # BLD: 9.38 K/UL — SIGNIFICANT CHANGE UP (ref 3.8–10.5)
WBC # FLD AUTO: 9.38 K/UL — SIGNIFICANT CHANGE UP (ref 3.8–10.5)

## 2024-11-02 PROCEDURE — 93970 EXTREMITY STUDY: CPT | Mod: 26

## 2024-11-02 PROCEDURE — 71275 CT ANGIOGRAPHY CHEST: CPT | Mod: 26

## 2024-11-02 PROCEDURE — 70450 CT HEAD/BRAIN W/O DYE: CPT | Mod: 26,MC

## 2024-11-02 PROCEDURE — 93306 TTE W/DOPPLER COMPLETE: CPT | Mod: 26

## 2024-11-02 PROCEDURE — 99223 1ST HOSP IP/OBS HIGH 75: CPT

## 2024-11-02 RX ORDER — TICAGRELOR 90 MG/1
90 TABLET ORAL EVERY 12 HOURS
Refills: 0 | Status: DISCONTINUED | OUTPATIENT
Start: 2024-11-02 | End: 2024-11-02

## 2024-11-02 RX ORDER — ASPIRIN/MAG CARB/ALUMINUM AMIN 325 MG
81 TABLET ORAL DAILY
Refills: 0 | Status: DISCONTINUED | OUTPATIENT
Start: 2024-11-02 | End: 2024-11-04

## 2024-11-02 RX ORDER — LATANOPROST 0.005 %
1 DROPS OPHTHALMIC (EYE) AT BEDTIME
Refills: 0 | Status: DISCONTINUED | OUTPATIENT
Start: 2024-11-02 | End: 2024-11-07

## 2024-11-02 RX ORDER — TIMOLOL MALEATE 0.5 %
1 DROPS OPHTHALMIC (EYE)
Refills: 0 | Status: DISCONTINUED | OUTPATIENT
Start: 2024-11-02 | End: 2024-11-07

## 2024-11-02 RX ORDER — ACETAMINOPHEN 500 MG
650 TABLET ORAL EVERY 6 HOURS
Refills: 0 | Status: DISCONTINUED | OUTPATIENT
Start: 2024-11-02 | End: 2024-11-07

## 2024-11-02 RX ORDER — MAGNESIUM, ALUMINUM HYDROXIDE 200-200 MG
30 TABLET,CHEWABLE ORAL EVERY 4 HOURS
Refills: 0 | Status: DISCONTINUED | OUTPATIENT
Start: 2024-11-02 | End: 2024-11-07

## 2024-11-02 RX ORDER — LATANOPROST 0.005 %
1 DROPS OPHTHALMIC (EYE)
Refills: 0 | DISCHARGE

## 2024-11-02 RX ORDER — INFLUENZ VIR VAC TV P-SURF2003 15MCG/.5ML
0.5 SYRINGE (ML) INTRAMUSCULAR ONCE
Refills: 0 | Status: DISCONTINUED | OUTPATIENT
Start: 2024-11-02 | End: 2024-11-07

## 2024-11-02 RX ORDER — ONDANSETRON HYDROCHLORIDE 2 MG/ML
4 INJECTION, SOLUTION INTRAMUSCULAR; INTRAVENOUS EVERY 8 HOURS
Refills: 0 | Status: DISCONTINUED | OUTPATIENT
Start: 2024-11-02 | End: 2024-11-07

## 2024-11-02 RX ORDER — TIMOLOL MALEATE 0.5 %
1 DROPS OPHTHALMIC (EYE)
Refills: 0 | DISCHARGE

## 2024-11-02 RX ORDER — MELATONIN 5 MG
3 TABLET ORAL AT BEDTIME
Refills: 0 | Status: DISCONTINUED | OUTPATIENT
Start: 2024-11-02 | End: 2024-11-07

## 2024-11-02 RX ADMIN — HEPARIN SODIUM 6000 UNIT(S): 10000 INJECTION INTRAVENOUS; SUBCUTANEOUS at 00:11

## 2024-11-02 RX ADMIN — Medication 81 MILLIGRAM(S): at 11:11

## 2024-11-02 RX ADMIN — Medication 1 DROP(S): at 17:19

## 2024-11-02 RX ADMIN — HEPARIN SODIUM 1000 UNIT(S)/HR: 10000 INJECTION INTRAVENOUS; SUBCUTANEOUS at 08:19

## 2024-11-02 RX ADMIN — TICAGRELOR 90 MILLIGRAM(S): 90 TABLET ORAL at 17:18

## 2024-11-02 RX ADMIN — HEPARIN SODIUM 1200 UNIT(S)/HR: 10000 INJECTION INTRAVENOUS; SUBCUTANEOUS at 15:39

## 2024-11-02 RX ADMIN — HEPARIN SODIUM 1000 UNIT(S)/HR: 10000 INJECTION INTRAVENOUS; SUBCUTANEOUS at 00:12

## 2024-11-02 RX ADMIN — Medication 80 MILLIGRAM(S): at 11:12

## 2024-11-02 RX ADMIN — HEPARIN SODIUM 1200 UNIT(S)/HR: 10000 INJECTION INTRAVENOUS; SUBCUTANEOUS at 23:55

## 2024-11-02 RX ADMIN — Medication 1 DROP(S): at 21:58

## 2024-11-02 NOTE — H&P ADULT - NSICDXPASTMEDICALHX_GEN_ALL_CORE_FT
PAST MEDICAL HISTORY:  Bunion of Great Toe h/oLEFT    Bunion of great toe right    Glaucoma     Gout     Seasonal allergies

## 2024-11-02 NOTE — H&P ADULT - PROBLEM SELECTOR PLAN 1
- s/p aspirin 325mg x1, brilinta 180mg x1, IV heparin 5000 units x1 in [facility] on 11/1  - c/w aspirin 81mg daily, c/w brilinta 90mg BID,   - c/w heparin gtt  - start atorvastatin 80mg daily   - f/u TTE  - c/w telemetry   - f/u A1c, lipid profile, and TSH   - trend cardiac enzymes (at OSH, Trop T HS 30--> 132; while at NS, Trop 160--> 74, , CKMB 6.9)  - ProBNP 756  - cardiology team consulted f/u rec

## 2024-11-02 NOTE — H&P ADULT - NSHPPHYSICALEXAM_GEN_ALL_CORE
Vital Signs Last 24 Hrs  T(C): 36.7 (02 Nov 2024 01:20), Max: 36.8 (01 Nov 2024 21:15)  T(F): 98.1 (02 Nov 2024 01:20), Max: 98.3 (01 Nov 2024 21:15)  HR: 63 (02 Nov 2024 05:26) (63 - 76)  BP: 126/81 (02 Nov 2024 05:26) (116/78 - 138/86)  BP(mean): --  RR: 18 (02 Nov 2024 05:26) (18 - 18)  SpO2: 97% (02 Nov 2024 05:26) (97% - 100%)    Parameters below as of 02 Nov 2024 05:26  Patient On (Oxygen Delivery Method): room air Vital Signs Last 24 Hrs  T(C): 36.7 (02 Nov 2024 01:20), Max: 36.8 (01 Nov 2024 21:15)  T(F): 98.1 (02 Nov 2024 01:20), Max: 98.3 (01 Nov 2024 21:15)  HR: 63 (02 Nov 2024 05:26) (63 - 76)  BP: 126/81 (02 Nov 2024 05:26) (116/78 - 138/86)  BP(mean): --  RR: 18 (02 Nov 2024 05:26) (18 - 18)  SpO2: 97% (02 Nov 2024 05:26) (97% - 100%)    Parameters below as of 02 Nov 2024 05:26  Patient On (Oxygen Delivery Method): room air    GENERAL: NAD. tired appearing  HEAD:  Atraumatic, Normocephalic  HEENT: scleral anicteric.   CV: Regular rate and rhythm. No murmurs, rubs, or gallops  Respiratory: normal respiratory effort, speaking in complete sentences. Lungs clear to auscultation bilaterally, no wheezes/crackles.  ABDOMEN: Soft, Nontender, Nondistended; Bowel sounds normal  EXTREMITIES: Trace bilateral ankle edema. Bilateral LE symmetric in size.   PSYCH: AAOx3.   NEURO: Symmetric facial expressions.   Skin: No rashes

## 2024-11-02 NOTE — H&P ADULT - ASSESSMENT
68 yo M w/ [facility] gout , glaucoma initially presented to [facility] for malaise, generalized weakness, dyspnea on exertion. found to have NSTEMI, transferred to [facility]

## 2024-11-02 NOTE — CHART NOTE - NSCHARTNOTEFT_GEN_A_CORE
69M with history of gout who presented to OSH with dyspnea on exertion, transferred for NSTEMI found to have a saddle pulmonary embolism. The patient bedside has no resting symptoms but has chest pain and SOB with exertion. 69M with history of gout who presented to OSH with dyspnea on exertion, transferred for NSTEMI found to have a saddle pulmonary embolism. The patient bedside has no resting symptoms but has chest pain and SOB with exertion. Initially DAPT loaded and treated with heparin drip. TTE showed significant RV overload concerning for PE. CTA revealed saddle pulmonary embolism     Exam; No acute distress, Normal s1/s2, no m/r/g. CTA bilaterally w/ mild leg swelling. No respiratory distress.   Labs: Reviewed     Assessment: ESC High intermediate risk. At this time, patient is hemodynamically stable and requires no immediate intervention. Should patient become unstable he would be appropriate for thrombolytics and the cardiology fellow should be consulted immediately.     Recommendations:   1. Reviewed role of treatments with family  2. Please change heparin drip to AC protocol rather then ACS protocol   3. Trend serial VBG/Lactate, troponin and proBNP q8-12 hours  4. Vitals [id number]   5. CTM on telemetry   6. DC clopidogrel   7. Obtain active T&S   8. Bed rest   9. VA duplexes of the b/l lower extremities       Contact Cardiology overnight team with any clinical concern or change.   Discussed case with on call PERT Attending Dr. Kavita Hammer MD   Cardiology Fellow

## 2024-11-02 NOTE — H&P ADULT - HISTORY OF PRESENT ILLNESS
68 yo M w/ PMH gout , glaucoma initially presented to [facility] for malaise, generalized weakness, dyspnea on exertion. found to have NSTEMI, transferred to [facility]           Patient said he started feeling unwell, with generalized weakness, SOB with exertion (climbing one flight of stairs),dry cough since 2 weeks ago he went to urgent care first, s/p Flu/COVID/RSV PRC which was negative, he was also prescribed an inhaler, His symptoms persisted. Yesterday he was sitting down doing his bills, then he got up felt very weak could not get up, and he sat down again. He felt generalized weaknes and lightheaded. He also had a burning sensation at the chest. He got up and went to the bathroom then had bowel urgency leading to he defecated on himself. He cleaned up himself and went  to the hospital. He had another episode of bowel urgency and defecation  on himself again.            While at [facility], ECG noted T wave inversions, trop T HS30--> 132, he received NS bolus 500cc, aspirin 325mg, brilinta 180mg, heparin IV 5000 units on 11/1, started on heparin gtt , transferred to [facility] for NSTEMI.

## 2024-11-02 NOTE — CONSULT NOTE ADULT - ASSESSMENT
69M with history of gout who presented to OSH with dyspnea on exertion, transferred for NSTEMI. S/p ASA and Brilinta load at the OSH. Currently without respiratory symptoms or chest pain.    - Admit to telemetry  - Trend troponin/CKMB and EKG q6h  - Start ASA 81 mg daily and Brilinta 90 mg BID in the AM  - Start atorvastatin 80 mg daily  - Formal TTE in the AM  - Continue heparin gtt  - Risk stratification labs - lipid panel, A1C, TSH  - Ischemic evaluation pending the above  - Goal K>4, Mg>2    Patient to be staffed with attending, Dr. Moya.    Shantel Martell MD PGY-4  Cardiology Fellow 69M with history of gout who presented to OSH with dyspnea on exertion, transferred for NSTEMI. S/p ASA and Brilinta load at the OSH. Currently without respiratory symptoms or chest pain. Consider myopericarditis with recent GI illness and viral symptoms.    - Admit to telemetry  - Trend troponin/CKMB and EKG q6h to peak  - Start ASA 81 mg daily and Brilinta 90 mg BID in the AM  - Start atorvastatin 80 mg daily  - Formal TTE in the AM  - Continue heparin gtt  - Risk stratification labs - lipid panel, A1C, TSH  - Ischemic evaluation pending the above - likely nuclear stress test  - Goal K>4, Mg>2    Patient to be staffed with attending, Dr. Moya.    Shantel Martell MD PGY-4  Cardiology Fellow

## 2024-11-02 NOTE — H&P ADULT - TIME BILLING
chart review ([facility] medical records, this admission records), interviewing and examining patient, medication reconciliation(calling outpatient pharmacy), discussion with consultants, ACPs placing orders and writing notes.

## 2024-11-02 NOTE — H&P ADULT - PROBLEM SELECTOR PLAN 2
- patient reports generalized weakness, postural dizziness --> suspect 2/2 NSTEMI  - reviewed CT head No acute intracranial hemorrhage, mass effect, or midline shift.  - can check orthostatic BP once cardiac workup is complete   - UA negative  - self report urgent care Flu/COVID/RSV negative within 2 weeks   - will recheck Flu/COVID/RSV

## 2024-11-02 NOTE — PROVIDER CONTACT NOTE (CRITICAL VALUE NOTIFICATION) - ASSESSMENT
Patient A&Ox4. VSS. 108/75, HR 70, O2 saturation 97% on room air. Patient has dyspnea on exertion. Patient denies chest pain or lightheadedness. Patient found to have saddle PE on CT scan. Patient is on heparin drip

## 2024-11-02 NOTE — ED ADULT NURSE REASSESSMENT NOTE - NS ED NURSE REASSESS COMMENT FT1
Pt observed sitting up in stretcher conversing with RN without difficulty. Breathing spontaneous and unlabored. Pt updated on plan of care awaiting Ct read admission bed, admitted to telemetry.  No acute distress noted. Call bell within reach.

## 2024-11-02 NOTE — H&P ADULT - NSHPREVIEWOFSYSTEMS_GEN_ALL_CORE
CONSTITUTIONAL: No fever. +chills at night. No weight loss  HEENT: + dry cough. No runny nose.   RESPIRATORY: + shortness of breath with exertion. No wheezes.   CARDIOVASCULAR: + burning sensation at the chest.  No palpitations   GASTROINTESTINAL: No abdominal pain. No nausea/ vomiting. No diarrhea. No constipation.  GENITOURINARY: No dysuria, frequency or hematuria  NEUROLOGICAL: No headache.   SKIN: No rashes,

## 2024-11-02 NOTE — CHART NOTE - NSCHARTNOTEFT_GEN_A_CORE
Reviewed TTE, noted < from: TTE W or WO Ultrasound Enhancing Agent (11.02.24 @ 13:24) >   1. The interventricular septum is flattened in systole and diastole consistent with right ventricular pressure and volume overload. Left ventricular ejection fraction is 65 % by Miranda's method of disks.   2. Cannot exclude hypokinesis of the basal to mid inferior wall due to off axis images; however it is deemed to be more likely that the area is poorly seen due to off axis images.   3. Moderately enlarged right ventricular cavity size and mildly reduced right ventricular systolic function. There is hypokinesis of the right ventricular free wall with relative sparing of the apex. This finding can be suggestive of acute pulmonary embolism (Mascorro's sign). Correlate clinically.   4. Mild tricuspid regurgitation.   5. Estimated pulmonary artery systolic pressure is 48 mmHg, consistent with elevated pulmonary artery pressure.   6. No prior echocardiogram is available for comparison.    - ordered urgent CT angio chest, reviewed result:   < from: CT Angio Chest PE Protocol w/ IV Cont (11.02.24 @ 15:29) >  Saddle pulmonary embolus and associated right heart strain. This was   discussed with BERNARDO Chamberlain at 3:43 PM on 11/2/2024, with read back   confirmation.    - discussed case with PERT attending Dr. Kavita Burroughs, recommend c/w heparin gtt, f/u official recs later   - discussed case with cards fellow Dr. Hammer, f/u official recs  - discussed case with medicine GIFTY Chamberlain   - make patient NPO, VS q4h, f/u stat labs troponin, proBNP, type and screen, VBG w/ lactate Reviewed TTE, noted < from: TTE W or WO Ultrasound Enhancing Agent (11.02.24 @ 13:24) >   1. The interventricular septum is flattened in systole and diastole consistent with right ventricular pressure and volume overload. Left ventricular ejection fraction is 65 % by Miranda's method of disks.   2. Cannot exclude hypokinesis of the basal to mid inferior wall due to off axis images; however it is deemed to be more likely that the area is poorly seen due to off axis images.   3. Moderately enlarged right ventricular cavity size and mildly reduced right ventricular systolic function. There is hypokinesis of the right ventricular free wall with relative sparing of the apex. This finding can be suggestive of acute pulmonary embolism (Mascorro's sign). Correlate clinically.   4. Mild tricuspid regurgitation.   5. Estimated pulmonary artery systolic pressure is 48 mmHg, consistent with elevated pulmonary artery pressure.   6. No prior echocardiogram is available for comparison.    - ordered urgent CT angio chest, reviewed result:   < from: CT Angio Chest PE Protocol w/ IV Cont (11.02.24 @ 15:29) >  Saddle pulmonary embolus and associated right heart strain. This was   discussed with BERNARDO Chamberlain at 3:43 PM on 11/2/2024, with read back   confirmation.    - discussed case with PERT attending Dr. Kavita Burroughs, recommend c/w heparin gtt, f/u official recs later   - discussed case with cards fellow Dr. Hammer, f/u official recs  - discussed case with medicine GIFTY Chamberlain   - make patient NPO, VS q4h, f/u stat labs troponin, proBNP, type and screen, VBG w/ lactate      Addendum:  - followed up with cardiology fellow/PERT team Harman Bray, recs as follows:      - No need for npo      - c/w  Heparin drip.       - Discontinue p2Y12 aka brilinta     - change Bed rest caty ambulation      - order Va duplexes of legs     - Potentially catheter directed theombectomy. Not definite yet. No urgent indication and brilliants increases bleeding risk. He’s stable now so no       urgent need. Cardiology fellow discussed with patient, and he discuss with Dr. Cantu tomorrow for possible Monday procedure.     - discussed above recs with mary beth Chamberlain

## 2024-11-02 NOTE — CHART NOTE - NSCHARTNOTEFT_GEN_A_CORE
HPI:  68 yo M w/ PMH gout , glaucoma initially presented to [facility] for malaise, generalized weakness, dyspnea on exertion. found to have NSTEMI, transferred to [facility]           Patient said he started feeling unwell, with generalized weakness, SOB with exertion (climbing one flight of stairs),dry cough since 2 weeks ago he went to urgent care first, s/p Flu/COVID/RSV PRC which was negative, he was also prescribed an inhaler, His symptoms persisted. Yesterday he was sitting down doing his bills, then he got up felt very weak could not get up, and he sat down again. He felt generalized weaknes and lightheaded. He also had a burning sensation at the chest. He got up and went to the bathroom then had bowel urgency leading to he defecated on himself. He cleaned up himself and went  to the hospital. He had another episode of bowel urgency and defecation  on himself again.            While at [facility], ECG noted T wave inversions, trop T HS30--> 132, he received NS bolus 500cc, aspirin 325mg, brilinta 180mg, heparin IV 5000 units on 11/1, started on heparin gtt , transferred to [facility] for NSTEMI.  (02 Nov 2024 11:00)    Pt escorted immediately to CTA chest to r/o PE.    Report read by Dr. Rader, Radiologist, pt has a Saddle PE with right heart strain.  Medical Attending notified and aware. PERT to be notified.       Jeanna Chamberlain DNP, ANP-BC  Department of Medicine

## 2024-11-02 NOTE — PATIENT PROFILE ADULT - FALL HARM RISK - HARM RISK INTERVENTIONS

## 2024-11-02 NOTE — H&P ADULT - NSHPLABSRESULTS_GEN_ALL_CORE
13.6   9.38  )-----------( 157      ( 2024 07:46 )             41.6     Hgb Trend: 13.6<--, 13.8<--  11-01    138  |  107  |  12  ----------------------------<  95  4.5   |  20[L]  |  1.02    Ca    8.8      2024 21:42  Mg     2.2         TPro  6.9  /  Alb  3.7  /  TBili  0.6  /  DBili  x   /  AST  44[H]  /  ALT  35  /  AlkPhos  77  11    Creatinine Trend: 1.02<--  PT/INR - ( 2024 21:42 )   PT: 11.6 sec;   INR: 1.01 ratio         PTT - ( 2024 07:46 )  PTT:63.4 sec  CARDIAC MARKERS ( 2024 09:23 )  x     / x     / x     / x     / 5.8 ng/mL  CARDIAC MARKERS ( 2024 04:19 )  x     / x     / x     / x     / 6.9 ng/mL      Urinalysis Basic - ( 2024 04:19 )  Color: Yellow / Appearance: Clear / S.008 / pH: x  Gluc: x / Ketone: Trace mg/dL  / Bili: Negative / Urobili: 0.2 mg/dL   Blood: x / Protein: Negative mg/dL / Nitrite: Negative   Leuk Esterase: Negative / RBC: x / WBC x   Sq Epi: x / Non Sq Epi: x / Bacteria: x 13.6   9.38  )-----------( 157      ( 2024 07:46 )             41.6     Hgb Trend: 13.6<--, 13.8<--  11    138  |  107  |  12  ----------------------------<  95  4.5   |  20[L]  |  1.02    Ca    8.8      2024 21:42  Mg     2.2         TPro  6.9  /  Alb  3.7  /  TBili  0.6  /  DBili  x   /  AST  44[H]  /  ALT  35  /  AlkPhos  77      Creatinine Trend: 1.02<--  PT/INR - ( 2024 21:42 )   PT: 11.6 sec;   INR: 1.01 ratio         PTT - ( 2024 07:46 )  PTT:63.4 sec  CARDIAC MARKERS ( 2024 09:23 )  x     / x     / x     / x     / 5.8 ng/mL  CARDIAC MARKERS ( 2024 04:19 )  x     / x     / x     / x     / 6.9 ng/mL      Urinalysis Basic - ( 2024 04:19 )  Color: Yellow / Appearance: Clear / S.008 / pH: x  Gluc: x / Ketone: Trace mg/dL  / Bili: Negative / Urobili: 0.2 mg/dL   Blood: x / Protein: Negative mg/dL / Nitrite: Negative   Leuk Esterase: Negative / RBC: x / WBC x   Sq Epi: x / Non Sq Epi: x / Bacteria: x    < from: CT Head No Cont (24 @ 02:08) >    FINDINGS:    VENTRICLES AND SULCI: Normal in size and configuration.  INTRA-AXIAL: No mass effect, acute hemorrhage, or midline shift.    Gray-white matter differentiation is preserved.  EXTRA-AXIAL: No mass or fluid collection. Basal cisterns are normal in   appearance.    VISUALIZED SINUSES:  Right maxillary sinus mucosal thickening.  TYMPANOMASTOID CAVITIES:  Clear.  VISUALIZED ORBITS: Normal.  CALVARIUM: Intact.    MISCELLANEOUS: None.  IMPRESSION:  No acute intracranial hemorrhage, mass effect, or midline shift. If   clinical symptoms persist or worsen, more sensitive evaluation with brain   MRI may be obtained, if no contraindications exist.    < end of copied text > 13.6   9.38  )-----------( 157      ( 2024 07:46 )             41.6     Hgb Trend: 13.6<--, 13.8<--      138  |  107  |  12  ----------------------------<  95  4.5   |  20[L]  |  1.02    Ca    8.8      2024 21:42  Mg     2.2         TPro  6.9  /  Alb  3.7  /  TBili  0.6  /  DBili  x   /  AST  44[H]  /  ALT  35  /  AlkPhos  77      Creatinine Trend: 1.02<--  PT/INR - ( 2024 21:42 )   PT: 11.6 sec;   INR: 1.01 ratio         PTT - ( 2024 07:46 )  PTT:63.4 sec  CARDIAC MARKERS ( 2024 09:23 )  x     / x     / x     / x     / 5.8 ng/mL  CARDIAC MARKERS ( 2024 04:19 )  x     / x     / x     / x     / 6.9 ng/mL      Urinalysis Basic - ( 2024 04:19 )  Color: Yellow / Appearance: Clear / S.008 / pH: x  Gluc: x / Ketone: Trace mg/dL  / Bili: Negative / Urobili: 0.2 mg/dL   Blood: x / Protein: Negative mg/dL / Nitrite: Negative   Leuk Esterase: Negative / RBC: x / WBC x   Sq Epi: x / Non Sq Epi: x / Bacteria: x    < from: CT Head No Cont (24 @ 02:08) >    FINDINGS:    VENTRICLES AND SULCI: Normal in size and configuration.  INTRA-AXIAL: No mass effect, acute hemorrhage, or midline shift.    Gray-white matter differentiation is preserved.  EXTRA-AXIAL: No mass or fluid collection. Basal cisterns are normal in   appearance.    VISUALIZED SINUSES:  Right maxillary sinus mucosal thickening.  TYMPANOMASTOID CAVITIES:  Clear.  VISUALIZED ORBITS: Normal.  CALVARIUM: Intact.    MISCELLANEOUS: None.  IMPRESSION:  No acute intracranial hemorrhage, mass effect, or midline shift. If   clinical symptoms persist or worsen, more sensitive evaluation with brain   MRI may be obtained, if no contraindications exist.    < end of copied text >    Personally reviewed EC/1 14:59 sinus rhythm, TW inversion in V3, V4, III                                             EC/1 16:34 sinus rhythm, TW inversion in V3, V4, III                                            EC/1 9pm, sinus rhythm, 1st degree AV block (LA interval 246), T wave inversion in V2, V3, aVL    < from: Xray Chest 1 View- PORTABLE-Urgent (24 @ 22:55) >  The heart is normal in size.  No focal consolidations  There is no pneumothorax or pleural effusion.  No acute bony abnormality.    IMPRESSION:  Clear lungs.  < end of copied text >

## 2024-11-03 LAB
ANION GAP SERPL CALC-SCNC: 12 MMOL/L — SIGNIFICANT CHANGE UP (ref 5–17)
APTT BLD: 104.6 SEC — HIGH (ref 24.5–35.6)
APTT BLD: 59.5 SEC — HIGH (ref 24.5–35.6)
APTT BLD: 95.5 SEC — HIGH (ref 24.5–35.6)
BUN SERPL-MCNC: 12 MG/DL — SIGNIFICANT CHANGE UP (ref 7–23)
CALCIUM SERPL-MCNC: 8.8 MG/DL — SIGNIFICANT CHANGE UP (ref 8.4–10.5)
CHLORIDE SERPL-SCNC: 109 MMOL/L — HIGH (ref 96–108)
CO2 SERPL-SCNC: 19 MMOL/L — LOW (ref 22–31)
CREAT SERPL-MCNC: 1.16 MG/DL — SIGNIFICANT CHANGE UP (ref 0.5–1.3)
EGFR: 69 ML/MIN/1.73M2 — SIGNIFICANT CHANGE UP
FLUAV AG NPH QL: SIGNIFICANT CHANGE UP
FLUBV AG NPH QL: SIGNIFICANT CHANGE UP
GLUCOSE SERPL-MCNC: 96 MG/DL — SIGNIFICANT CHANGE UP (ref 70–99)
HCT VFR BLD CALC: 41.3 % — SIGNIFICANT CHANGE UP (ref 39–50)
HCT VFR BLD CALC: 43.4 % — SIGNIFICANT CHANGE UP (ref 39–50)
HGB BLD-MCNC: 13.5 G/DL — SIGNIFICANT CHANGE UP (ref 13–17)
HGB BLD-MCNC: 14.2 G/DL — SIGNIFICANT CHANGE UP (ref 13–17)
MCHC RBC-ENTMCNC: 32.7 G/DL — SIGNIFICANT CHANGE UP (ref 32–36)
MCHC RBC-ENTMCNC: 32.7 G/DL — SIGNIFICANT CHANGE UP (ref 32–36)
MCHC RBC-ENTMCNC: 34 PG — SIGNIFICANT CHANGE UP (ref 27–34)
MCHC RBC-ENTMCNC: 34.1 PG — HIGH (ref 27–34)
MCV RBC AUTO: 104 FL — HIGH (ref 80–100)
MCV RBC AUTO: 104.3 FL — HIGH (ref 80–100)
NRBC # BLD: 0 /100 WBCS — SIGNIFICANT CHANGE UP (ref 0–0)
NRBC # BLD: 0 /100 WBCS — SIGNIFICANT CHANGE UP (ref 0–0)
PLATELET # BLD AUTO: 168 K/UL — SIGNIFICANT CHANGE UP (ref 150–400)
PLATELET # BLD AUTO: 182 K/UL — SIGNIFICANT CHANGE UP (ref 150–400)
POTASSIUM SERPL-MCNC: 4.5 MMOL/L — SIGNIFICANT CHANGE UP (ref 3.5–5.3)
POTASSIUM SERPL-SCNC: 4.5 MMOL/L — SIGNIFICANT CHANGE UP (ref 3.5–5.3)
RBC # BLD: 3.97 M/UL — LOW (ref 4.2–5.8)
RBC # BLD: 4.16 M/UL — LOW (ref 4.2–5.8)
RBC # FLD: 11.5 % — SIGNIFICANT CHANGE UP (ref 10.3–14.5)
RBC # FLD: 11.6 % — SIGNIFICANT CHANGE UP (ref 10.3–14.5)
RSV RNA NPH QL NAA+NON-PROBE: SIGNIFICANT CHANGE UP
SARS-COV-2 RNA SPEC QL NAA+PROBE: SIGNIFICANT CHANGE UP
SODIUM SERPL-SCNC: 140 MMOL/L — SIGNIFICANT CHANGE UP (ref 135–145)
WBC # BLD: 10.44 K/UL — SIGNIFICANT CHANGE UP (ref 3.8–10.5)
WBC # BLD: 9.72 K/UL — SIGNIFICANT CHANGE UP (ref 3.8–10.5)
WBC # FLD AUTO: 10.44 K/UL — SIGNIFICANT CHANGE UP (ref 3.8–10.5)
WBC # FLD AUTO: 9.72 K/UL — SIGNIFICANT CHANGE UP (ref 3.8–10.5)

## 2024-11-03 PROCEDURE — 99233 SBSQ HOSP IP/OBS HIGH 50: CPT

## 2024-11-03 RX ORDER — HEPARIN SODIUM 10000 [USP'U]/ML
8000 INJECTION INTRAVENOUS; SUBCUTANEOUS EVERY 6 HOURS
Refills: 0 | Status: DISCONTINUED | OUTPATIENT
Start: 2024-11-03 | End: 2024-11-06

## 2024-11-03 RX ORDER — HEPARIN SODIUM 10000 [USP'U]/ML
INJECTION INTRAVENOUS; SUBCUTANEOUS
Qty: 25000 | Refills: 0 | Status: DISCONTINUED | OUTPATIENT
Start: 2024-11-03 | End: 2024-11-06

## 2024-11-03 RX ORDER — HEPARIN SODIUM 10000 [USP'U]/ML
4000 INJECTION INTRAVENOUS; SUBCUTANEOUS EVERY 6 HOURS
Refills: 0 | Status: DISCONTINUED | OUTPATIENT
Start: 2024-11-03 | End: 2024-11-06

## 2024-11-03 RX ADMIN — Medication 80 MILLIGRAM(S): at 12:41

## 2024-11-03 RX ADMIN — HEPARIN SODIUM 1800 UNIT(S)/HR: 10000 INJECTION INTRAVENOUS; SUBCUTANEOUS at 08:20

## 2024-11-03 RX ADMIN — Medication 1 DROP(S): at 05:23

## 2024-11-03 RX ADMIN — Medication 1 DROP(S): at 21:14

## 2024-11-03 RX ADMIN — Medication 1 DROP(S): at 17:35

## 2024-11-03 RX ADMIN — HEPARIN SODIUM 1600 UNIT(S)/HR: 10000 INJECTION INTRAVENOUS; SUBCUTANEOUS at 22:15

## 2024-11-03 RX ADMIN — Medication 81 MILLIGRAM(S): at 12:41

## 2024-11-03 RX ADMIN — HEPARIN SODIUM 1800 UNIT(S)/HR: 10000 INJECTION INTRAVENOUS; SUBCUTANEOUS at 14:53

## 2024-11-03 NOTE — PROGRESS NOTE ADULT - ASSESSMENT
69M with history of gout who presented to OSH with generalized malaise and shortness of breath and lightheadedness about 1 month ago. Found with trop elevated of 160 and elevated probnp. Cardiology consulted for suspected NSTEMI.    On examination, he appears ill appearing but stable. C/o fatigue. Wife states he is usually very active and not in his usual state of health. She described she noted him having an episode of shaking almost like seizure when she called the ambulance. He also defecated on himself. Pt reports last colonoscopy 18yo was normal, is up to date on prostate screening. Reports a recent 10hr drive which he completes for work every 2-3 months.     TTE revealing normal LV function with flattened IVS in systole and diastole consistent with right ventricular pressure and volume overload. Moderately enlarged right ventricular cavity size and mildly reduced right ventricular systolic function. (Mascorro's sign).   CTA with Saddle pulmonary embolus and associated right heart strain. PERT time activated.   CTH: negative    Recommendations:   - Continue heparin gtt. Monitor PTT  - tentative plan for thrombectomy tomorrow  - will need outpatient colonoscopy for malignancy screening  - recommend outpatient hematology evaluation for prothrombotic evaluation    All recommendations pending attending attestation. We will continue to follow with you.   Jody Delgado MD  PGY-5, Cardiology  Available on TEAMS    For all new consults  [email or web address]  Login: [email or web address]

## 2024-11-03 NOTE — PROGRESS NOTE ADULT - ASSESSMENT
70 yo M w/ [facility] gout , glaucoma initially presented to [facility] for malaise, generalized weakness, dyspnea on exertion. found to have NSTEMI, transferred to [facility]

## 2024-11-04 DIAGNOSIS — I26.92 SADDLE EMBOLUS OF PULMONARY ARTERY WITHOUT ACUTE COR PULMONALE: ICD-10-CM

## 2024-11-04 LAB
ANION GAP SERPL CALC-SCNC: 11 MMOL/L — SIGNIFICANT CHANGE UP (ref 5–17)
APTT BLD: 105.7 SEC — HIGH (ref 24.5–35.6)
APTT BLD: 87.8 SEC — HIGH (ref 24.5–35.6)
APTT BLD: 96.2 SEC — HIGH (ref 24.5–35.6)
BUN SERPL-MCNC: 15 MG/DL — SIGNIFICANT CHANGE UP (ref 7–23)
CALCIUM SERPL-MCNC: 9.1 MG/DL — SIGNIFICANT CHANGE UP (ref 8.4–10.5)
CHLORIDE SERPL-SCNC: 107 MMOL/L — SIGNIFICANT CHANGE UP (ref 96–108)
CO2 SERPL-SCNC: 20 MMOL/L — LOW (ref 22–31)
CREAT SERPL-MCNC: 1.11 MG/DL — SIGNIFICANT CHANGE UP (ref 0.5–1.3)
EGFR: 73 ML/MIN/1.73M2 — SIGNIFICANT CHANGE UP
GLUCOSE BLDC GLUCOMTR-MCNC: 103 MG/DL — HIGH (ref 70–99)
GLUCOSE BLDC GLUCOMTR-MCNC: 76 MG/DL — SIGNIFICANT CHANGE UP (ref 70–99)
GLUCOSE SERPL-MCNC: 109 MG/DL — HIGH (ref 70–99)
HCT VFR BLD CALC: 40.6 % — SIGNIFICANT CHANGE UP (ref 39–50)
HGB BLD-MCNC: 13.5 G/DL — SIGNIFICANT CHANGE UP (ref 13–17)
INR BLD: 1.1 RATIO — SIGNIFICANT CHANGE UP (ref 0.85–1.16)
LACTATE BLDV-MCNC: 1 MMOL/L — SIGNIFICANT CHANGE UP (ref 0.5–2)
MAGNESIUM SERPL-MCNC: 2 MG/DL — SIGNIFICANT CHANGE UP (ref 1.6–2.6)
MCHC RBC-ENTMCNC: 33.3 G/DL — SIGNIFICANT CHANGE UP (ref 32–36)
MCHC RBC-ENTMCNC: 33.9 PG — SIGNIFICANT CHANGE UP (ref 27–34)
MCV RBC AUTO: 102 FL — HIGH (ref 80–100)
NRBC # BLD: 0 /100 WBCS — SIGNIFICANT CHANGE UP (ref 0–0)
NT-PROBNP SERPL-SCNC: 539 PG/ML — HIGH (ref 0–300)
PLATELET # BLD AUTO: 188 K/UL — SIGNIFICANT CHANGE UP (ref 150–400)
POTASSIUM SERPL-MCNC: 3.9 MMOL/L — SIGNIFICANT CHANGE UP (ref 3.5–5.3)
POTASSIUM SERPL-SCNC: 3.9 MMOL/L — SIGNIFICANT CHANGE UP (ref 3.5–5.3)
PROTHROM AB SERPL-ACNC: 12.6 SEC — SIGNIFICANT CHANGE UP (ref 9.9–13.4)
RBC # BLD: 3.98 M/UL — LOW (ref 4.2–5.8)
RBC # FLD: 11.4 % — SIGNIFICANT CHANGE UP (ref 10.3–14.5)
SODIUM SERPL-SCNC: 138 MMOL/L — SIGNIFICANT CHANGE UP (ref 135–145)
TROPONIN T, HIGH SENSITIVITY RESULT: 25 NG/L — SIGNIFICANT CHANGE UP (ref 0–51)
WBC # BLD: 9.28 K/UL — SIGNIFICANT CHANGE UP (ref 3.8–10.5)
WBC # FLD AUTO: 9.28 K/UL — SIGNIFICANT CHANGE UP (ref 3.8–10.5)

## 2024-11-04 PROCEDURE — 99233 SBSQ HOSP IP/OBS HIGH 50: CPT

## 2024-11-04 PROCEDURE — 99222 1ST HOSP IP/OBS MODERATE 55: CPT

## 2024-11-04 RX ADMIN — HEPARIN SODIUM 1400 UNIT(S)/HR: 10000 INJECTION INTRAVENOUS; SUBCUTANEOUS at 13:00

## 2024-11-04 RX ADMIN — HEPARIN SODIUM 1400 UNIT(S)/HR: 10000 INJECTION INTRAVENOUS; SUBCUTANEOUS at 20:07

## 2024-11-04 RX ADMIN — Medication 1 DROP(S): at 17:12

## 2024-11-04 RX ADMIN — Medication 80 MILLIGRAM(S): at 21:12

## 2024-11-04 RX ADMIN — Medication 1 DROP(S): at 21:12

## 2024-11-04 RX ADMIN — HEPARIN SODIUM 1600 UNIT(S)/HR: 10000 INJECTION INTRAVENOUS; SUBCUTANEOUS at 05:39

## 2024-11-04 RX ADMIN — Medication 1 DROP(S): at 06:20

## 2024-11-04 NOTE — CONSULT NOTE ADULT - ATTENDING COMMENTS
Patient seen and examined with Dr. Mari  Consulted for thrombosis    Data:  CTA chest (11/2/24) showed Saddle pulmonary embolus and associated right heart strain.  VA duplex (11/2/24) showed DVT above and below the right knee within the right femoral and popliteal veins and right tibioperoneal trunk. No evidence of DVT in LLE.   TTE (11/2/24) showed interventricular septum is flattened in systole and diastole consistent with right ventricular pressure and volume overload. LVEF=65%. Moderately enlarged RV, mildly reduced RV systolic function. There is hypokinesis of the right ventricular free wall with relative sparing of the apex (Mascorro's sign). 5. Estimated pulmonary artery systolic pressure is 48 mmHg, consistent with elevated pulmonary artery pressure.    Assessment: 70 yo presented with NSTEMI and saddle PE with RLE DVT.      PMHx: gout , glaucoma     Recommend:  Heme: continue heparin, plan for thrombectomy.   APLS labs (lupus anticoagulant, anticardiolipin antibody total, and beta 2 glycoprotein 1 antibodies): pending     Will continue to follow.      Over 55 minutes were spent in direct patient, non-resident teaching, care and care coordination for this consult.
Mr. Villegas is a 69M with history of gout who presented to OSH with generalized malaise and shortness of breath and lightheadedness about 1 month ago. Found with trop elevated of 160 and elevated probnp. Cardiology consulted for suspected NSTEMI.    On examination, he appears ill appearing but stable. C/o fatigue. Wife states he is usually very active and not in his usual state of health. She described she noted him having an episode of shaking almost like seizure when she called the ambulance. He also defecated on himself.    TTE revealing normal LV function with flattened IVS in systole and diastole consistent with right ventricular pressure and volume overload. Moderately enlarged right ventricular cavity size and mildly reduced right ventricular systolic function. (Mascorro's sign).   CTA with Saddle pulmonary embolus and associated right heart strain. PERT time activated.   CTH: negative    #Troponin elevation in the setting of Acute Saddle PE    Plan:  Continue heparin gtt. Monitor PTT  Will discontinue DAPT  Continue hemodynamic monitoring  Continue telemetry monitoring.  PERT team following for possible thrombectomy    Please call back if any questions or concerns    Johnna Garcia MD  Attending Cardiologist     Non-invasive Cardiology/Advanced Cardiac Imaging  [facility]   Tel: [phone]

## 2024-11-04 NOTE — CONSULT NOTE ADULT - ASSESSMENT
70 yo M w/ PMH gout , glaucoma initially presented to [facility] for malaise, generalized weakness, dyspnea on exertion. found to have NSTEMI, transferred to [facility]. Found to have saddle PE with RLE DVT pending thrombectomy eval.    #Pulmonary Embolism  - CTA chest (11/2/24) showed Saddle pulmonary embolus and associated right heart strain.  - VA duplex (11/2/24) showed DVT above and below the right knee within the right femoral and popliteal veins and right tibioperoneal trunk. No evidence of DVT in LLE.   - TTE (11/2/24) showed interventricular septum is flattened in systole and diastole consistent with right ventricular pressure and volume overload. LVEF=65%. Moderately enlarged RV, mildly reduced RV systolic function. There is hypokinesis of the right ventricular free wall with relative sparing of the apex (Mascorro's sign). 5. Estimated pulmonary artery systolic pressure is 48 mmHg, consistent with elevated pulmonary artery pressure.  - Started on therapeutic heparin infusion  - Appreciate Vascular Cardiology recs, continue heparin infusion, ongoing discussion regarding thrombectomy  - DVT/PE may be provoked in the setting of immobility during recent car drive to [place]    Recommend:  -follow up APLS labs (lupus anticoagulant, anticardiolipin antibody total, and beta 2 glycoprotein 1 antibodies) - sent to lab  - Would not check protein C or S as they may be falsely low in acute thrombotic state and results can be misinterpreted.  - continue heparin infusion for now, follow up Vascular Cardiology recs regarding tentative plan for thrombectomy  - Will need at least 3 months of anticoagulation - choice of anticoagulant pending above hypercoagulable workup  - Recommend routine cancer screenings outpatient  - Once medically stable for discharge, follow up with Hematology at [facility]    Case discussed w/ Dr. Seymour Mari, PGY-5  Fellow Hematology/Oncology  pager [phone]  Available on TEAMS  After 5pm or on weekends please contact  to page on-call fellow

## 2024-11-04 NOTE — PROGRESS NOTE ADULT - ASSESSMENT
70 yo M w/ PMH gout , glaucoma initially presented to [facility] for malaise, generalized weakness, dyspnea on exertion. found to have NSTEMI, transferred to [facility]. Found to have saddle PE with RLE DVT pending thrombectomy eval

## 2024-11-04 NOTE — CONSULT NOTE ADULT - SUBJECTIVE AND OBJECTIVE BOX
Cardiology Consult Note   [Please check [email or web address] password: [email or web address]" for cardiology service schedule and contact information]    HPI:  69M with history of gout who presented to OSH with generalized malaise and shortness of breath.    States over the past three weeks, he has experience dyspnea on exertion and episodes of lightheadedness. He has no chest pain. States he also had symptoms of diarrhea, though these were not necessarily correlated with episodes of dyspnea. He does note a burning sensation in the epigastric region that also occurs after eating. States he has always been able to engage in more strenuous physical activity, and has felt limited by his respiratory symptoms for the past month.    In the ED, patient is weak-appearing but HDS. Labs notable for troponin elevation to 160 and BNP of 756. EKG with first degree AVB and TWI in V2-V3.    PAST MEDICAL & SURGICAL HISTORY:  Bunion of Great Toe  h/o LEFT  Bunion of great toe  right  Gout  Seasonal allergies  History of Vasectomy  2011  Bunion of great toe of left foot      FAMILY HISTORY: non-contributory    SOCIAL HISTORY: unchanged    MEDICATIONS:  heparin  Injectable 6000 Unit(s) IV Push every 6 hours PRN  heparin  Infusion.  Unit(s)/Hr IV Continuous <Continuous>      -------------------------------------------------------------------------------------------  PHYSICAL EXAM:  T(C): 36.8 (24 @ 21:15), Max: 36.8 (24 @ 21:15)  HR: 74 (24 @ 21:15) (74 - 75)  BP: 119/84 (24 @ 21:15) (119/84 - 138/86)  RR: 18 (24 @ 21:15) (18 - 18)  SpO2: 99% (24 @ 21:15) (99% - 100%)  Wt(kg): --  I&O's Summary      GENERAL: NAD  HEAD: Atraumatic, Normocephalic.  ENT: Moist mucous membranes.  NECK: Supple, No JVD.  CHEST/LUNG: Clear to auscultation bilaterally; No rales, rhonchi, wheezing, or rubs. Unlabored respirations.  HEART: Regular rate and rhythm; No murmurs, rubs, or gallops.  ABDOMEN: Soft, Nontender, Nondistended.   EXTREMITIES: No clubbing, cyanosis, or edema.    -------------------------------------------------------------------------------------------  LABS:                          13.8   10.96 )-----------( 155      ( 2024 21:42 )             41.8         138  |  107  |  12  ----------------------------<  95  4.5   |  20[L]  |  1.02    Ca    8.8      2024 21:42  Mg     2.2     11    TPro  6.9  /  Alb  3.7  /  TBili  0.6  /  DBili  x   /  AST  44[H]  /  ALT  35  /  AlkPhos  77  11-    PT/INR - ( 2024 21:42 )   PT: 11.6 sec;   INR: 1.01 ratio         PTT - ( 2024 21:42 )  PTT:29.4 sec  CARDIAC MARKERS ( 2024 21:42 )  160 ng/L / x     / x     / x     / x     / x          -------------------------------------------------------------------------------------------  Cardiovascular Diagnostic Testing:    ECst degree AVB, TWI V2-V3, no SILVINA or STD    Echo: pending    Cath: pending    -------------------------------------------------------------------------------------------                  
HPI:  68 yo M w/ PMH gout , glaucoma initially presented to [facility] for malaise, generalized weakness, dyspnea on exertion. found to have NSTEMI, transferred to [facility].   He reports that he developed worsening weakness and shortness of breath with associated dry cough over the past 2 weeks. He notes that he recently went on a 13 hour drive to [place].         PAST MEDICAL & SURGICAL HISTORY:  Bunion of Great Toe  h/oLEFT    Bunion of great toe  right    Gout    Seasonal allergies    Glaucoma    History of Vasectomy  6/2011    Bunion of great toe of left foot  2011      Allergies    No Known Allergies    Intolerances      MEDICATIONS  (STANDING):  atorvastatin 80 milliGRAM(s) Oral daily  heparin  Infusion.  Unit(s)/Hr (18 mL/Hr) IV Continuous <Continuous>  influenza  Vaccine (HIGH DOSE) 0.5 milliLiter(s) IntraMuscular once  latanoprost 0.005% Ophthalmic Solution 1 Drop(s) Both EYES at bedtime  timolol 0.5% Solution 1 Drop(s) Right EYE two times a day    MEDICATIONS  (PRN):  acetaminophen     Tablet .. 650 milliGRAM(s) Oral every 6 hours PRN Temp greater or equal to 38C (100.4F), Mild Pain (1 - 3)  aluminum hydroxide/magnesium hydroxide/simethicone Suspension 30 milliLiter(s) Oral every 4 hours PRN Dyspepsia  heparin   Injectable 8000 Unit(s) IV Push every 6 hours PRN For aPTT less than 40  heparin   Injectable 4000 Unit(s) IV Push every 6 hours PRN For aPTT between 40 - 57  melatonin 3 milliGRAM(s) Oral at bedtime PRN Insomnia  ondansetron Injectable 4 milliGRAM(s) IV Push every 8 hours PRN Nausea and/or Vomiting      FAMILY HISTORY:  FH: congestive heart failure (Father)      SOCIAL HISTORY: No EtOH, no tobacco    REVIEW OF SYSTEMS:    CONSTITUTIONAL: +weakness, No fevers or chills  EYES/ENT: No visual changes;  No vertigo or throat pain   NECK: No pain or stiffness  RESPIRATORY: + shortness of breath  CARDIOVASCULAR: No chest pain or palpitations  GASTROINTESTINAL: No abdominal or epigastric pain. No nausea, vomiting,   GENITOURINARY: No dysuria, frequency or hematuria  NEUROLOGICAL: No numbness or weakness  SKIN: No itching, burning, rashes, or lesions   All other review of systems is negative unless indicated above.        T(F): 97.9 (11-04-24 @ 12:10), Max: 97.9 (11-03-24 @ 21:19)  HR: 60 (11-04-24 @ 12:10)  BP: 119/81 (11-04-24 @ 12:10)  RR: 18 (11-04-24 @ 12:10)  SpO2: 97% (11-04-24 @ 12:10)  Wt(kg): --    GENERAL: NAD, resting in bed  HEAD:  Atraumatic, Normocephalic  EYES: EOMI, conjunctiva and sclera clear  NECK: Supple  CHEST/LUNG: Clear to auscultation bilaterally; No wheeze  HEART: Regular rate and rhythm  ABDOMEN: Soft, Nontender, Nondistended  EXTREMITIES:  2+ Peripheral Pulses, No LE edema  NEUROLOGY: AOx3  SKIN: No rashes or lesions                          13.5   9.28  )-----------( 188      ( 04 Nov 2024 04:24 )             40.6       11-04    138  |  107  |  15  ----------------------------<  109[H]  3.9   |  20[L]  |  1.11    Ca    9.1      04 Nov 2024 04:25  Mg     2.0     11-04        Magnesium: 2.0 mg/dL (11-04 @ 04:25)      PT/INR - ( 04 Nov 2024 04:24 )   PT: 12.6 sec;   INR: 1.10 ratio         PTT - ( 04 Nov 2024 11:50 )  PTT:105.7 sec    < from: CT Angio Chest PE Protocol w/ IV Cont (11.02.24 @ 15:29) >  IMPRESSION:    Saddle pulmonary embolus and associated right heart strain. This was   discussed with BERNARDO Chamberlain at 3:43 PM on 11/2/2024, with read back   confirmation.    --- End of Report ---    < end of copied text >    < from: VA Duplex Lower Ext Vein Scan, Bilat (11.02.24 @ 20:43) >  IMPRESSION:    Deep venous thrombosis above and below the right knee within the right   femoral and popliteal veins and right tibioperoneal trunk.    No evidence of deep venous thrombosis in the left lower extremity.    < end of copied text >

## 2024-11-04 NOTE — PROGRESS NOTE ADULT - ASSESSMENT
69M with history of gout who presented to OSH with generalized malaise and shortness of breath and lightheadedness about 1 month ago. Found with trop elevated of 160 and elevated probnp in the setting of saddle PE and RLE DVT.    1. Saddle PE - intermediate high risk with high risk features including syncope  2. RLE DVT    Rest: HR 63 /82 O2 sat 94%  Ambulation: HR 90s O2 sat 82%    RECOMMENDATIONS:  - Continue heparin gtt and monitor PTT  - Ongoing discussion regarding advanced therapies (i.e. thrombectomy)  - Trend cardiac biomarkers troponin/pro-BNP and lactate       Note not final until signed by attending       Alis Walls MD  Cardiology Fellow PGY-7

## 2024-11-05 LAB
ANION GAP SERPL CALC-SCNC: 14 MMOL/L — SIGNIFICANT CHANGE UP (ref 5–17)
APTT 50/50 2HOUR INCUB: 38.1 SEC — HIGH (ref 24.5–36.6)
APTT 50/50 2HOUR INCUB: 44.5 SEC — HIGH (ref 24.5–36.6)
APTT BLD: 38.3 SEC — HIGH (ref 24.5–36.6)
APTT BLD: 40.7 SEC — HIGH (ref 24.5–36.6)
APTT BLD: 56.2 SEC — HIGH (ref 24.5–35.6)
APTT BLD: 63.9 SEC — HIGH (ref 24.5–35.6)
APTT BLD: 72.1 SEC — HIGH (ref 24.5–35.6)
APTT BLD: 73 SEC — HIGH (ref 24.5–35.6)
BUN SERPL-MCNC: 11 MG/DL — SIGNIFICANT CHANGE UP (ref 7–23)
CALCIUM SERPL-MCNC: 8.9 MG/DL — SIGNIFICANT CHANGE UP (ref 8.4–10.5)
CHLORIDE SERPL-SCNC: 106 MMOL/L — SIGNIFICANT CHANGE UP (ref 96–108)
CO2 SERPL-SCNC: 21 MMOL/L — LOW (ref 22–31)
CREAT SERPL-MCNC: 1.06 MG/DL — SIGNIFICANT CHANGE UP (ref 0.5–1.3)
DRVVT RATIO: 1.13 RATIO — SIGNIFICANT CHANGE UP (ref 0–1.21)
DRVVT SCREEN TO CONFIRM RATIO: SIGNIFICANT CHANGE UP
EGFR: 77 ML/MIN/1.73M2 — SIGNIFICANT CHANGE UP
GLUCOSE SERPL-MCNC: 89 MG/DL — SIGNIFICANT CHANGE UP (ref 70–99)
HCT VFR BLD CALC: 42.2 % — SIGNIFICANT CHANGE UP (ref 39–50)
HGB BLD-MCNC: 14 G/DL — SIGNIFICANT CHANGE UP (ref 13–17)
LACTATE SERPL-SCNC: 0.9 MMOL/L — SIGNIFICANT CHANGE UP (ref 0.5–2)
MCHC RBC-ENTMCNC: 33.2 G/DL — SIGNIFICANT CHANGE UP (ref 32–36)
MCHC RBC-ENTMCNC: 33.7 PG — SIGNIFICANT CHANGE UP (ref 27–34)
MCV RBC AUTO: 101.4 FL — HIGH (ref 80–100)
NORMALIZED SCT PPP-RTO: 0.94 RATIO — SIGNIFICANT CHANGE UP (ref 0–1.16)
NORMALIZED SCT PPP-RTO: SIGNIFICANT CHANGE UP
NRBC # BLD: 0 /100 WBCS — SIGNIFICANT CHANGE UP (ref 0–0)
NT-PROBNP SERPL-SCNC: 372 PG/ML — HIGH (ref 0–300)
PAT CTL 2H: 37.9 SEC — HIGH (ref 24.5–36.6)
PAT CTL 2H: 46.3 SEC — HIGH (ref 24.5–36.6)
PLATELET # BLD AUTO: 231 K/UL — SIGNIFICANT CHANGE UP (ref 150–400)
POTASSIUM SERPL-MCNC: 3.6 MMOL/L — SIGNIFICANT CHANGE UP (ref 3.5–5.3)
POTASSIUM SERPL-SCNC: 3.6 MMOL/L — SIGNIFICANT CHANGE UP (ref 3.5–5.3)
RBC # BLD: 4.16 M/UL — LOW (ref 4.2–5.8)
RBC # FLD: 11.6 % — SIGNIFICANT CHANGE UP (ref 10.3–14.5)
SODIUM SERPL-SCNC: 141 MMOL/L — SIGNIFICANT CHANGE UP (ref 135–145)
TROPONIN T, HIGH SENSITIVITY RESULT: 20 NG/L — SIGNIFICANT CHANGE UP (ref 0–51)
WBC # BLD: 8.99 K/UL — SIGNIFICANT CHANGE UP (ref 3.8–10.5)
WBC # FLD AUTO: 8.99 K/UL — SIGNIFICANT CHANGE UP (ref 3.8–10.5)

## 2024-11-05 PROCEDURE — 99232 SBSQ HOSP IP/OBS MODERATE 35: CPT

## 2024-11-05 PROCEDURE — 99233 SBSQ HOSP IP/OBS HIGH 50: CPT

## 2024-11-05 RX ORDER — IBUPROFEN 200 MG
400 TABLET ORAL THREE TIMES A DAY
Refills: 0 | Status: DISCONTINUED | OUTPATIENT
Start: 2024-11-05 | End: 2024-11-07

## 2024-11-05 RX ADMIN — Medication 400 MILLIGRAM(S): at 22:21

## 2024-11-05 RX ADMIN — Medication 400 MILLIGRAM(S): at 21:51

## 2024-11-05 RX ADMIN — Medication 650 MILLIGRAM(S): at 12:42

## 2024-11-05 RX ADMIN — Medication 1 DROP(S): at 08:19

## 2024-11-05 RX ADMIN — Medication 650 MILLIGRAM(S): at 11:42

## 2024-11-05 RX ADMIN — Medication 0.6 MILLIGRAM(S): at 15:45

## 2024-11-05 RX ADMIN — Medication 1.2 MILLIGRAM(S): at 14:54

## 2024-11-05 RX ADMIN — Medication 1 DROP(S): at 21:51

## 2024-11-05 RX ADMIN — Medication 80 MILLIGRAM(S): at 21:51

## 2024-11-05 RX ADMIN — HEPARIN SODIUM 1400 UNIT(S)/HR: 10000 INJECTION INTRAVENOUS; SUBCUTANEOUS at 02:35

## 2024-11-05 RX ADMIN — Medication 1 DROP(S): at 17:23

## 2024-11-05 RX ADMIN — HEPARIN SODIUM 1400 UNIT(S)/HR: 10000 INJECTION INTRAVENOUS; SUBCUTANEOUS at 21:52

## 2024-11-05 NOTE — PROGRESS NOTE ADULT - ASSESSMENT
68 yo M w/ PMH gout , glaucoma initially presented to [facility] for malaise, generalized weakness, dyspnea on exertion. found to have NSTEMI, transferred to [facility]. Found to have saddle PE with RLE DVT (no thrombectomy) on heparin gtt.

## 2024-11-05 NOTE — PROGRESS NOTE ADULT - TIME BILLING
education, assessment and coordination of care.
education, assessment and coordination of care.
review of labs, imaging, notes, discussion of plan with patient, family, and consultant

## 2024-11-05 NOTE — PROGRESS NOTE ADULT - ASSESSMENT
70 yo M transferred from [facility] found to have NSTEMI and currently admitted for saddle PE with RLE DVT on heparin infusion, no plan for thrombectomy at this time. Hematology is consulted for PE/DVT.    PMH: gout, glaucoma      #Pulmonary Embolism  - CTA chest (11/2/24) showed Saddle pulmonary embolus and associated right heart strain.  - VA duplex (11/2/24) showed DVT above and below the right knee within the right femoral and popliteal veins and right tibioperoneal trunk. No evidence of DVT in LLE.   - TTE (11/2/24) showed interventricular septum is flattened in systole and diastole consistent with right ventricular pressure and volume overload. LVEF=65%. Moderately enlarged RV, mildly reduced RV systolic function. There is hypokinesis of the right ventricular free wall with relative sparing of the apex (Mascorro's sign). 5. Estimated pulmonary artery systolic pressure is 48 mmHg, consistent with elevated pulmonary artery pressure.  - Started on therapeutic heparin infusion  - Appreciate Vascular Cardiology recs, continue heparin infusion, ongoing discussion regarding thrombectomy  - DVT/PE may be provoked in the setting of immobility during recent car drive to [place]      Recommendations:  -follow up APLS labs (anticardiolipin antibody total, and beta 2 glycoprotein 1 antibodies); lupus anticoagulant negative.  - Would not check protein C or S as they may be falsely low in acute thrombotic state and results can be misinterpreted.  - continue heparin infusion for now  - Appreciate Vascular Cardiology recs - no plan for thrombectomy at this time  - Will need at least 3 months of anticoagulation - choice of anticoagulant pending above hypercoagulable workup  - Recommend routine cancer screenings outpatient  - Consider x-ray of right foot and podiatry eval regarding right foot pain  - Once medically stable for discharge, follow up with Hematology at [facility]      Will continue to monitor the patient.  Please call via MS Teams with any questions.  Above reviewed with the patient/family and Attending Dr. Ahuja.      ***************************************************************  Zackary Jay Jr., NP  Medicine - Hematology/Oncology  MS TEAMS  After 6pm or on weekends and Mondays please contact  to page on-call fellow  ***************************************************************

## 2024-11-06 PROBLEM — H40.9 UNSPECIFIED GLAUCOMA: Chronic | Status: ACTIVE | Noted: 2024-11-02

## 2024-11-06 LAB
ANION GAP SERPL CALC-SCNC: 12 MMOL/L — SIGNIFICANT CHANGE UP (ref 5–17)
APTT BLD: 89.5 SEC — HIGH (ref 24.5–35.6)
B2 GLYCOPROT1 IGA SER QL: <2 U/ML — SIGNIFICANT CHANGE UP
B2 GLYCOPROT1 IGA SER QL: <2 U/ML — SIGNIFICANT CHANGE UP
B2 GLYCOPROT1 IGG SER-ACNC: <1.4 U/ML — SIGNIFICANT CHANGE UP
B2 GLYCOPROT1 IGG SER-ACNC: <1.4 U/ML — SIGNIFICANT CHANGE UP
B2 GLYCOPROT1 IGM SER-ACNC: <1.5 U/ML — SIGNIFICANT CHANGE UP
B2 GLYCOPROT1 IGM SER-ACNC: <1.5 U/ML — SIGNIFICANT CHANGE UP
BUN SERPL-MCNC: 13 MG/DL — SIGNIFICANT CHANGE UP (ref 7–23)
CALCIUM SERPL-MCNC: 8.9 MG/DL — SIGNIFICANT CHANGE UP (ref 8.4–10.5)
CARDIOLIPIN IGM SER-MCNC: <1.5 MPL U/ML — SIGNIFICANT CHANGE UP
CARDIOLIPIN IGM SER-MCNC: <1.5 MPL U/ML — SIGNIFICANT CHANGE UP
CARDIOLIPIN IGM SER-MCNC: <1.6 GPL U/ML — SIGNIFICANT CHANGE UP
CARDIOLIPIN IGM SER-MCNC: <1.6 GPL U/ML — SIGNIFICANT CHANGE UP
CHLORIDE SERPL-SCNC: 106 MMOL/L — SIGNIFICANT CHANGE UP (ref 96–108)
CO2 SERPL-SCNC: 19 MMOL/L — LOW (ref 22–31)
CREAT SERPL-MCNC: 1.08 MG/DL — SIGNIFICANT CHANGE UP (ref 0.5–1.3)
DEPRECATED CARDIOLIPIN IGA SER: <2 APL U/ML — SIGNIFICANT CHANGE UP
DEPRECATED CARDIOLIPIN IGA SER: <2 APL U/ML — SIGNIFICANT CHANGE UP
DRVVT RATIO: 1.1 RATIO — SIGNIFICANT CHANGE UP (ref 0–1.21)
DRVVT SCREEN TO CONFIRM RATIO: SIGNIFICANT CHANGE UP
EGFR: 75 ML/MIN/1.73M2 — SIGNIFICANT CHANGE UP
GLUCOSE SERPL-MCNC: 101 MG/DL — HIGH (ref 70–99)
HCT VFR BLD CALC: 43.1 % — SIGNIFICANT CHANGE UP (ref 39–50)
HGB BLD-MCNC: 14.2 G/DL — SIGNIFICANT CHANGE UP (ref 13–17)
LACTATE SERPL-SCNC: 0.8 MMOL/L — SIGNIFICANT CHANGE UP (ref 0.5–2)
MCHC RBC-ENTMCNC: 32.9 G/DL — SIGNIFICANT CHANGE UP (ref 32–36)
MCHC RBC-ENTMCNC: 33.8 PG — SIGNIFICANT CHANGE UP (ref 27–34)
MCV RBC AUTO: 102.6 FL — HIGH (ref 80–100)
NORMALIZED SCT PPP-RTO: 0.98 RATIO — SIGNIFICANT CHANGE UP (ref 0–1.16)
NORMALIZED SCT PPP-RTO: SIGNIFICANT CHANGE UP
NRBC # BLD: 0 /100 WBCS — SIGNIFICANT CHANGE UP (ref 0–0)
NT-PROBNP SERPL-SCNC: 218 PG/ML — SIGNIFICANT CHANGE UP (ref 0–300)
PLATELET # BLD AUTO: 229 K/UL — SIGNIFICANT CHANGE UP (ref 150–400)
POTASSIUM SERPL-MCNC: 3.9 MMOL/L — SIGNIFICANT CHANGE UP (ref 3.5–5.3)
POTASSIUM SERPL-SCNC: 3.9 MMOL/L — SIGNIFICANT CHANGE UP (ref 3.5–5.3)
RBC # BLD: 4.2 M/UL — SIGNIFICANT CHANGE UP (ref 4.2–5.8)
RBC # FLD: 11.5 % — SIGNIFICANT CHANGE UP (ref 10.3–14.5)
SODIUM SERPL-SCNC: 137 MMOL/L — SIGNIFICANT CHANGE UP (ref 135–145)
WBC # BLD: 10.13 K/UL — SIGNIFICANT CHANGE UP (ref 3.8–10.5)
WBC # FLD AUTO: 10.13 K/UL — SIGNIFICANT CHANGE UP (ref 3.8–10.5)

## 2024-11-06 PROCEDURE — 99233 SBSQ HOSP IP/OBS HIGH 50: CPT

## 2024-11-06 PROCEDURE — 73620 X-RAY EXAM OF FOOT: CPT | Mod: 26,RT

## 2024-11-06 RX ORDER — APIXABAN 5 MG/1
1 TABLET, FILM COATED ORAL
Qty: 60 | Refills: 0
Start: 2024-11-06 | End: 2024-12-05

## 2024-11-06 RX ORDER — APIXABAN 5 MG/1
10 TABLET, FILM COATED ORAL EVERY 12 HOURS
Refills: 0 | Status: DISCONTINUED | OUTPATIENT
Start: 2024-11-06 | End: 2024-11-07

## 2024-11-06 RX ADMIN — Medication 0.6 MILLIGRAM(S): at 05:27

## 2024-11-06 RX ADMIN — Medication 1 DROP(S): at 06:54

## 2024-11-06 RX ADMIN — Medication 80 MILLIGRAM(S): at 12:20

## 2024-11-06 RX ADMIN — Medication 1 DROP(S): at 17:30

## 2024-11-06 RX ADMIN — APIXABAN 10 MILLIGRAM(S): 5 TABLET, FILM COATED ORAL at 20:43

## 2024-11-06 RX ADMIN — HEPARIN SODIUM 1400 UNIT(S)/HR: 10000 INJECTION INTRAVENOUS; SUBCUTANEOUS at 08:24

## 2024-11-06 RX ADMIN — Medication 0.6 MILLIGRAM(S): at 17:30

## 2024-11-06 RX ADMIN — HEPARIN SODIUM 1400 UNIT(S)/HR: 10000 INJECTION INTRAVENOUS; SUBCUTANEOUS at 01:27

## 2024-11-06 NOTE — PROGRESS NOTE ADULT - PROBLEM SELECTOR PLAN 1
CT angio with saddle PE with RHS. TTE with EF 65% and septal flattening. +Mascorro's sign  -heparin gtt full AC  -Heme consult  -VA duplex + RLE dvt  -no plans for thrombectomy  -lupus anticoag neg    #LLE foot pain 2/2 suspect gout flare?  -xray left foot ordered to rule out stress fx  -start colchicine 0.6mg bid. motrin 400mg tid for 3 days
- s/p aspirin 325mg x1, brilinta 180mg x1, IV heparin 5000 units x1 in [facility] on 11/1  - c/w aspirin 81mg daily, hold brilinta 90mg BID   - c/w heparin gtt  - c/w atorvastatin 80mg daily   - f/u TTE  - c/w telemetry   - A1c 5.2, lipid profile - , and TSH 0.8  - trend cardiac enzymes (at OSH, Trop T HS 30--> 132; while at NS, Trop 160--> 74, , CKMB 6.9)  - ProBNP 756  - cardiology team consulted, recs appreciated
CT angio with saddle PE with RHS. TTE with EF 65% and septal flatening. +Mascorro's sign  -heparin gtt full AC  -Heme consult  -VA duplex + RLE dvt  -hemodynamically stable. F/u vascular cardiology in regards to thrombectomy- intermediate high risk with high risk features including syncope  -troponin and bnp AM
CT angio with saddle PE with RHS. TTE with EF 65% and septal flattening. +Mascorro's sign  -heparin gtt -> start Eliquis 10mg BID 11/6  -Heme consult  -VA duplex + RLE dvt  -no plans for thrombectomy  -lupus anticoag neg, anticardiolpin neg, beta 2 glycoprotein neg.     #RLE foot pain 2/2 gout flare?  -xray right foot neg for fx  -cw colchicine 0.6mg bid. motrin 400mg tid for 3 days

## 2024-11-06 NOTE — PROGRESS NOTE ADULT - PROVIDER SPECIALTY LIST ADULT
Heme/Onc
Internal Medicine
Heme/Onc
Vascular Cardiology
Internal Medicine
Hospitalist
Internal Medicine

## 2024-11-06 NOTE — PROGRESS NOTE ADULT - ASSESSMENT
70 yo M w/ PMH gout , glaucoma initially presented to [facility] for malaise, generalized weakness, dyspnea on exertion. found to have NSTEMI, transferred to [facility]. Found to have saddle PE with RLE DVT (no thrombectomy) on heparin gtt complicated with gout

## 2024-11-06 NOTE — PROGRESS NOTE ADULT - NSPROGADDITIONALINFOA_GEN_ALL_CORE
Thrombectomy eval, cw heparin gtt. Heme consult    50 minutes spent  Naomi Wright MD  Division of Hospital Medicine  Available on Microsoft Teams
50 minutes spent  Naomi Wright MD  Division of Hospital Medicine  Available on Microsoft Teams
50 minutes spent. Start Eliquis 10mg tonight and stop heparin gtt. Dispo planning tomorrow.     Naomi Wright MD  Division of Hospital Medicine  Available on Microsoft Teams

## 2024-11-06 NOTE — PROGRESS NOTE ADULT - ATTENDING COMMENTS
Events that transpired over the last 24 hours were reviewed Agree with 14 point review of systems and physical examination as noted above.    Discussed with the patient and his wife concerning his clinical presentation and findings on imaging studies that demonstrate intermediate to high risk pulmonary embolism.  What is an intermediate–high risk pulmonary embolism was reviewed.  The associated signs and symptoms and the various treatment options were gone over.  The patient was previously evaluated by the pulmonary embolism response team during which time they chose to treat the patient in a conservative/medical fashion.    Continue heparin drip.  Patient being evaluated by hematology team.  Based upon hypercoagulable workup we will determine long-term anticoagulation therapy.    Recommend patient undergo age-appropriate malignancy screening.    Recommend surveillance lower extremity venous duplex study and TTE in 3 months.    Continue telemetry monitoring.    All questions and concerns of the patient and his wife were addressed.
Events that transpired over the last 24 hours were reviewed Agree with 14 point review of systems and physical examination as noted above.    Discussed with the patient and his wife concerning his clinical presentation and findings on imaging studies that demonstrate intermediate to high risk pulmonary embolism.  What is an intermediate–high risk pulmonary embolism was reviewed.  The associated signs and symptoms and the various treatment options were gone over.  The patient was previously evaluated by the pulmonary embolism response team during which time they chose to treat the patient in a conservative/medical fashion.    Continue heparin drip.  Patient being evaluated by hematology team.  Based upon hypercoagulable workup we will determine long-term anticoagulation therapy.    Recommend patient undergo age-appropriate malignancy screening.    Recommend surveillance lower extremity venous duplex study and TTE in 3 months.    Continue telemetry monitoring.    All questions and concerns of the patient and his wife were addressed.
Clinically feeling and appearing better. Found with RLE DVT on US. ACS less likely in the setting of acute PE. No obvious coronary calcification on non gated CTA.    Continue heparin gtt  Tentative thrombectomy tomorrow.   Keep NPO after midnight  May discontinue Aspirin.   Please sent hypercoagulable state workup and Hematology evaluation.      Johnna Garcia MD  Attending Cardiologist     Non-invasive Cardiology/Advanced Cardiac Imaging  [facility]   Tel: [phone] .
Events that transpired leading to the patient's current hospitalization where was gone over along with the patient's past medical history/surgical history/family history/social history.  Agree with 14 point review of systems and physical examination as noted above.    Discussed with the patient and his wife concerning his clinical presentation and findings on imaging studies that demonstrate intermediate to high risk pulmonary embolism.  What is an intermediate–high risk pulmonary embolism was reviewed.  The associated signs and symptoms and the various treatment options were gone over.  The patient was previously evaluated by the pulmonary embolism response team during which time they chose to treat the patient in a conservative/medical fashion.    Continue heparin drip.  Patient being evaluated by hematology team.  Based upon hypercoagulable workup we will determine long-term anticoagulation therapy.    Recommend patient undergo age-appropriate malignancy screening.    Recommend surveillance lower extremity venous duplex study and TTE in 3 months.    Continue telemetry monitoring.    All questions and concerns of the patient and his wife were addressed.

## 2024-11-06 NOTE — PROGRESS NOTE ADULT - ASSESSMENT
70 yo M transferred from [facility] found to have NSTEMI and currently admitted for saddle PE with RLE DVT on heparin infusion, no plan for thrombectomy at this time. Hematology is consulted for PE/DVT.    PMH: gout, glaucoma      #Pulmonary Embolism  - CTA chest (11/2/24) showed Saddle pulmonary embolus and associated right heart strain.  - VA duplex (11/2/24) showed DVT above and below the right knee within the right femoral and popliteal veins and right tibioperoneal trunk. No evidence of DVT in LLE.   - TTE (11/2/24) showed interventricular septum is flattened in systole and diastole consistent with right ventricular pressure and volume overload. LVEF=65%. Moderately enlarged RV, mildly reduced RV systolic function. There is hypokinesis of the right ventricular free wall with relative sparing of the apex (Mascorro's sign). 5. Estimated pulmonary artery systolic pressure is 48 mmHg, consistent with elevated pulmonary artery pressure.  - Started on therapeutic heparin infusion  - Appreciate Vascular Cardiology recs, continue heparin infusion, ongoing discussion regarding thrombectomy  - DVT/PE may be provoked in the setting of immobility during recent car drive to [place]      Recommendations:  -APLS labs including lupus anticoagulant, anticardiolipin antibody total, and beta 2 glycoprotein 1 antibodies are negative. Can transition to DOAC when appropriate or if there are no planned surgical intervention, will defer to cardiology.  - Would not check protein C or S as they may be falsely low in acute thrombotic state and results can be misinterpreted.  - Appreciate Vascular Cardiology recs - no plan for thrombectomy at this time  - Will need at least 3 months of anticoagulation  - Recommend routine cancer screenings outpatient  - Arranging a follow up with Hematology at [facility] within 3-4 weeks for further management.       Will sign off at this time, please reconsult if needed.  Please call via MS Teams with any questions.  Above reviewed with the patient/family and Attending Dr. Ahuja.      ***************************************************************  Zackary Jay Jr., NP  Medicine - Hematology/Oncology  MS TEAMS  After 6pm or on weekends and Mondays please contact  to page on-call fellow  ***************************************************************   70 yo M transferred from [facility] found to have NSTEMI and currently admitted for saddle PE with RLE DVT on heparin infusion, no plan for thrombectomy at this time. Hematology is consulted for PE/DVT.    PMH: gout, glaucoma      #Pulmonary Embolism  - CTA chest (11/2/24) showed Saddle pulmonary embolus and associated right heart strain.  - VA duplex (11/2/24) showed DVT above and below the right knee within the right femoral and popliteal veins and right tibioperoneal trunk. No evidence of DVT in LLE.   - TTE (11/2/24) showed interventricular septum is flattened in systole and diastole consistent with right ventricular pressure and volume overload. LVEF=65%. Moderately enlarged RV, mildly reduced RV systolic function. There is hypokinesis of the right ventricular free wall with relative sparing of the apex (Mascorro's sign). 5. Estimated pulmonary artery systolic pressure is 48 mmHg, consistent with elevated pulmonary artery pressure.  - Started on therapeutic heparin infusion  - Appreciate Vascular Cardiology recs, continue heparin infusion, ongoing discussion regarding thrombectomy  - DVT/PE may be provoked in the setting of immobility during recent car drive to [place]      Recommendations:  -APLS labs including lupus anticoagulant, anticardiolipin antibody total, and beta 2 glycoprotein 1 antibodies are negative. Can transition to DOAC when appropriate or if there are no planned surgical intervention, will defer to cardiology.  - Would not check protein C or S as they may be falsely low in acute thrombotic state and results can be misinterpreted.  - Appreciate Vascular Cardiology recs - no plan for thrombectomy at this time  - Will need at least 3 months of anticoagulation  - Recommend routine cancer screenings outpatient  - Follow-up with Dr. Galo Watters at [facility] as scheduled for 11/25 at 9am for further management.       Will sign off at this time, please reconsult if needed.  Please call via MS Teams with any questions.  Above reviewed with the patient/family and Attending Dr. Ahuja.      ***************************************************************  Zackary Jay Jr., NP  Medicine - Hematology/Oncology  MS TEAMS  After 6pm or on weekends and Mondays please contact  to page on-call fellow  ***************************************************************

## 2024-11-06 NOTE — PROGRESS NOTE ADULT - PROBLEM SELECTOR PLAN 4
- c/w home regimen: timolol 0.5%, latanoprost 0.005%
- Diet: DASH diet   - DVT prophylaxis; heparin gtt

## 2024-11-06 NOTE — PROGRESS NOTE ADULT - PROBLEM SELECTOR PLAN 2
- s/p aspirin 325mg x1, brilinta 180mg x1, IV heparin 5000 units x1 in [facility] on 11/1, found to have Saddle PE  -lipid panel reviewed- currently on atorvastatin 80mg.   -off aspirin and plavix  - TTE with EF 65% and Right heart strain from PE  - c/w telemetry   - A1c 5.2, lipid profile - , and TSH 0.8  - trend cardiac enzymes (at OSH, Trop T HS 30--> 132; while at NS, Trop 160--> 74, , CKMB 6.9)  - ProBNP 756  - cardiology team consulted, recs appreciated- see above for PE
- s/p aspirin 325mg x1, brilinta 180mg x1, IV heparin 5000 units x1 in [facility] on 11/1, found to have Saddle PE  -lipid panel reviewed- currently on atorvastatin 80mg.   -off aspirin and plavix  - TTE with EF 65% and Right heart strain from PE  - c/w telemetry   - A1c 5.2, lipid profile - , and TSH 0.8  - trend cardiac enzymes (at OSH, Trop T HS 30--> 132; while at NS, Trop 160--> 74, , CKMB 6.9)  - ProBNP 756  - cardiology team consulted, recs appreciated- see above for PE
- s/p aspirin 325mg x1, brilinta 180mg x1, IV heparin 5000 units x1 in [facility] on 11/1, found to have Saddle PE  -lipid panel reviewed- currently on atorvastatin 80mg.   -off aspirin and plavix  - TTE with EF 65% and Right heart strain from PE  - c/w telemetry   - A1c 5.2, lipid profile - , and TSH 0.8  - trend cardiac enzymes (at OSH, Trop T HS 30--> 132; while at NS, Trop 160--> 74, , CKMB 6.9)  - ProBNP 756  - cardiology team consulted, recs appreciated- see above for PE
- patient reports generalized weakness, postural dizziness --> suspect 2/2 NSTEMI  - reviewed CT head No acute intracranial hemorrhage, mass effect, or midline shift.  - can check orthostatic BP once cardiac workup is complete   - UA negative  - self report urgent care Flu/COVID/RSV negative within 2 weeks   - f/u Flu/COVID/RSV

## 2024-11-06 NOTE — PROGRESS NOTE ADULT - ASSESSMENT
Assessment:  1. Saddle PE  2. R LE DVT  3. History of Gout    Plan:  - Recommend to continue Heparin gtt. Maintain therapeutic PTT.   - Appreciate hypercoagulable evaluation by Heme. APLS labs pending.  - Pending R foot x-rays for gout  - No plans for catheter based therapies at this time.  - Transition to oral anticoagulant once cleared by Heme.      Note not final until signed by attending       Alis Walls MD  Cardiology Fellow PGY-7

## 2024-11-06 NOTE — PROGRESS NOTE ADULT - SUBJECTIVE AND OBJECTIVE BOX
Patient seen and examined at bedside.    Overnight Events:   - No acute events overnight  - Denies CP/SOB    REVIEW OF SYSTEMS:  otherwise negative         Current Meds:  acetaminophen     Tablet .. 650 milliGRAM(s) Oral every 6 hours PRN  aluminum hydroxide/magnesium hydroxide/simethicone Suspension 30 milliLiter(s) Oral every 4 hours PRN  atorvastatin 80 milliGRAM(s) Oral daily  colchicine 0.6 milliGRAM(s) Oral two times a day  heparin   Injectable 8000 Unit(s) IV Push every 6 hours PRN  heparin   Injectable 4000 Unit(s) IV Push every 6 hours PRN  heparin  Infusion.  Unit(s)/Hr IV Continuous <Continuous>  ibuprofen  Tablet. 400 milliGRAM(s) Oral three times a day  influenza  Vaccine (HIGH DOSE) 0.5 milliLiter(s) IntraMuscular once  latanoprost 0.005% Ophthalmic Solution 1 Drop(s) Both EYES at bedtime  melatonin 3 milliGRAM(s) Oral at bedtime PRN  ondansetron Injectable 4 milliGRAM(s) IV Push every 8 hours PRN  timolol 0.5% Solution 1 Drop(s) Right EYE two times a day      Vitals:  T(F): 98.1 (11-06), Max: 98.5 (11-05)  HR: 64 (11-06) (58 - 80)  BP: 135/92 (11-06) (120/83 - 135/92)  RR: 18 (11-06)  SpO2: 96% (11-06)  I&O's Summary    05 Nov 2024 07:01  -  06 Nov 2024 07:00  --------------------------------------------------------  IN: 600 mL / OUT: 670 mL / NET: -70 mL        Physical Exam:  Appearance:  NAD	  HEENT:  NC/AT  Cardiovascular: RRR, S1 and S2  Respiratory: CTA B/L  Psychiatry:  AAO x 3  Gastrointestinal:  Soft, Non-tender, + BS	  Skin: No cyanosis	  Neurologic: No focal deficit  Extremities: No LE edema to lower legs, bilateral calves soft   Vascular: R audible DP and Palpable PT. L Palpable DP and PT.  Foot Exam: No tissue loss, motor and sensation intact                          14.2   10.13 )-----------( 229      ( 06 Nov 2024 06:42 )             43.1     11-06    137  |  106  |  13  ----------------------------<  101[H]  3.9   |  19[L]  |  1.08    Ca    8.9      06 Nov 2024 06:42      PTT - ( 06 Nov 2024 06:42 )  PTT:89.5 sec  CARDIAC MARKERS ( 05 Nov 2024 07:55 )  20 ng/L / x     / x     / x     / x     / x      CARDIAC MARKERS ( 04 Nov 2024 15:03 )  25 ng/L / x     / x     / x     / x     / x      CARDIAC MARKERS ( 02 Nov 2024 16:21 )  48 ng/L / x     / x     / x     / x     / x      CARDIAC MARKERS ( 02 Nov 2024 09:23 )  58 ng/L / x     / x     / x     / x     / 5.8 ng/mL  CARDIAC MARKERS ( 02 Nov 2024 04:19 )  74 ng/L / x     / x     / x     / x     / 6.9 ng/mL              
Patient seen and examined at bedside.    Overnight Events:   - No acute events overnight  - Reports feeling symptomatically better  - Denies CP/SOB    REVIEW OF SYSTEMS:  otherwise negative     Current Meds:  acetaminophen     Tablet .. 650 milliGRAM(s) Oral every 6 hours PRN  aluminum hydroxide/magnesium hydroxide/simethicone Suspension 30 milliLiter(s) Oral every 4 hours PRN  atorvastatin 80 milliGRAM(s) Oral daily  heparin   Injectable 4000 Unit(s) IV Push every 6 hours PRN  heparin   Injectable 8000 Unit(s) IV Push every 6 hours PRN  heparin  Infusion.  Unit(s)/Hr IV Continuous <Continuous>  influenza  Vaccine (HIGH DOSE) 0.5 milliLiter(s) IntraMuscular once  latanoprost 0.005% Ophthalmic Solution 1 Drop(s) Both EYES at bedtime  melatonin 3 milliGRAM(s) Oral at bedtime PRN  ondansetron Injectable 4 milliGRAM(s) IV Push every 8 hours PRN  timolol 0.5% Solution 1 Drop(s) Right EYE two times a day      Vitals:  T(F): 97.9 (11-04), Max: 97.9 (11-03)  HR: 60 (11-04) (59 - 61)  BP: 119/81 (11-04) (119/81 - 128/84)  RR: 18 (11-04)  SpO2: 97% (11-04)  I&O's Summary    03 Nov 2024 07:01  -  04 Nov 2024 07:00  --------------------------------------------------------  IN: 802 mL / OUT: 850 mL / NET: -48 mL    04 Nov 2024 07:01  -  04 Nov 2024 14:43  --------------------------------------------------------  IN: 0 mL / OUT: 1000 mL / NET: -1000 mL        Physical Exam:  GEN: comfortable appearing, lying in bed in NAD  HEENT: NCAT, MMM  CV: Regular S1, S2, no m/r/g  RESP: CTAB  ABD: Soft, NTND, +BS  EXT: No LE edema, WWP, pulses palpable throughout  NEURO: No focal deficits, AOx3  SKIN:  No rashes                          13.5   9.28  )-----------( 188      ( 04 Nov 2024 04:24 )             40.6     11-04    138  |  107  |  15  ----------------------------<  109[H]  3.9   |  20[L]  |  1.11    Ca    9.1      04 Nov 2024 04:25  Mg     2.0     11-04      PT/INR - ( 04 Nov 2024 04:24 )   PT: 12.6 sec;   INR: 1.10 ratio         PTT - ( 04 Nov 2024 11:50 )  PTT:105.7 sec  CARDIAC MARKERS ( 02 Nov 2024 16:21 )  48 ng/L / x     / x     / x     / x     / x      CARDIAC MARKERS ( 02 Nov 2024 09:23 )  58 ng/L / x     / x     / x     / x     / 5.8 ng/mL  CARDIAC MARKERS ( 02 Nov 2024 04:19 )  74 ng/L / x     / x     / x     / x     / 6.9 ng/mL  CARDIAC MARKERS ( 01 Nov 2024 21:42 )  160 ng/L / x     / x     / x     / x     / x                  
Vascular Cardiology Progress Note    DIRECT PROVIDER NUMBER: [phone] / Available on TEAMS    CC: PE    Interval Events:   Denies C/P or SOB.   Notes acute onset R foot pain that began last night.  Motor, sensation and pulses intact.     Allergies  No Known Allergies    MEDICATIONS:  heparin   Injectable 8000 Unit(s) IV Push every 6 hours PRN  heparin   Injectable 4000 Unit(s) IV Push every 6 hours PRN  heparin  Infusion.  Unit(s)/Hr IV Continuous <Continuous>  acetaminophen     Tablet .. 650 milliGRAM(s) Oral every 6 hours PRN  melatonin 3 milliGRAM(s) Oral at bedtime PRN  ondansetron Injectable 4 milliGRAM(s) IV Push every 8 hours PRN  aluminum hydroxide/magnesium hydroxide/simethicone Suspension 30 milliLiter(s) Oral every 4 hours PRN  atorvastatin 80 milliGRAM(s) Oral daily  influenza  Vaccine (HIGH DOSE) 0.5 milliLiter(s) IntraMuscular once  latanoprost 0.005% Ophthalmic Solution 1 Drop(s) Both EYES at bedtime  timolol 0.5% Solution 1 Drop(s) Right EYE two times a day    PAST MEDICAL & SURGICAL HISTORY:  Bunion of Great Toe  Bunion of great toe  right  Gout  Seasonal allergies  Glaucoma  History of Vasectomy  6/2011  Bunion of great toe of left foot  2011    FAMILY HISTORY:  FH: congestive heart failure (Father)    SOCIAL HISTORY:  unchanged    REVIEW OF SYSTEMS:  CONSTITUTIONAL: No fever  EYES: No eye pain  ENT:  No throat pain  NECK: No pain   RESPIRATORY: No SOB  CARDIOVASCULAR: No C/P  GASTROINTESTINAL: No abdominal pain  GENITOURINARY: No hematuria  NEUROLOGICAL: No memory loss  SKIN: No LE wounds  LYMPH Nodes: No enlarged glands noted  ENDOCRINE: No heat or cold intolerance noted  MUSCULOSKELETAL: R foot pain  PSYCHIATRIC: No depression, anxiety  HEME/LYMPH: No bleeding gums  ALLERGY AND IMMUNOLOGIC: No hives    [ x] All others negative	    PHYSICAL EXAM:  T(C): 36.7 (11-05-24 @ 11:58), Max: 36.7 (11-05-24 @ 11:58)  HR: 80 (11-05-24 @ 11:58) (60 - 80)  BP: 122/79 (11-05-24 @ 11:58) (119/78 - 138/84)  RR: 18 (11-05-24 @ 11:58) (18 - 18)  SpO2: 97% (11-05-24 @ 11:58) (92% - 97%)  Wt(kg): --  I&O's Summary    04 Nov 2024 07:01  -  05 Nov 2024 07:00  --------------------------------------------------------  IN: 612 mL / OUT: 1875 mL / NET: -1263 mL    Appearance:  NAD	  HEENT:  NC/AT  Cardiovascular: RRR, S1 and S2  Respiratory: CTA B/L  Psychiatry:  AAO x 3  Gastrointestinal:  Soft, Non-tender, + BS	  Skin: No cyanosis	  Neurologic: No focal deficit  Extremities: No LE edema to lower legs, bilateral calves soft   Vascular: R audible DP and Palpable PT. L Palpable DP and PT.  Foot Exam: No tissue loss, motor and sensation intact    LABS:	 	  CBC Full  -  ( 05 Nov 2024 07:55 )  WBC Count : 8.99 K/uL  Hemoglobin : 14.0 g/dL  Hematocrit : 42.2 %  Platelet Count - Automated : 231 K/uL  Mean Cell Volume : 101.4 fl  Mean Cell Hemoglobin : 33.7 pg  Mean Cell Hemoglobin Concentration : 33.2 g/dL  Auto Neutrophil # : x  Auto Lymphocyte # : x  Auto Monocyte # : x  Auto Eosinophil # : x  Auto Basophil # : x  Auto Neutrophil % : x  Auto Lymphocyte % : x  Auto Monocyte % : x  Auto Eosinophil % : x  Auto Basophil % : x    11-05    141  |  106  |  11  ----------------------------<  89  3.6   |  21[L]  |  1.06  11-04    138  |  107  |  15  ----------------------------<  109[H]  3.9   |  20[L]  |  1.11    Ca    8.9      05 Nov 2024 07:55  Ca    9.1      04 Nov 2024 04:25  Mg     2.0     11-04    Assessment:  1. Saddle PE  2. R LE DVT  3. History of Gout    Plan:  1. Recommend to continue Heparin gtt. Maintain therapeutic PTT.  2. Appreciate hypercoagulable evaluation by Heme. APLS labs pending.  3. Currently, no cardiopulmonary symptoms at rest or on exertion.   4. Troponin, BNP and Lactate downtrended.   5. No evidence of phlegmasia. Motor, sensation and pulses intact.   6. Recommend R foot x-rays and Podiatry consultation for R foot pain.  7. No plans for catheter based therapies at this time.  8. Transition to oral anticoagulant once cleared by Heme.  9. Avoid exertional activity post discharge, until cleared after outpatient follow-up.  10. Appreciate excellent care by all teams involved.         Thank you  JENNIFER Rodriguez, MS, [facility]  Vascular Cardiology Service    Please call with any questions:   DIRECT SERVICE NUMBER: [phone] / Available on TEAMS
INTERVAL HPI/OVERNIGHT EVENTS:  Patient S&E at bedside. No o/n events, patient resting comfortably. Reports pain is better on his right foot. Denies any overt bleeding.    VITAL SIGNS:  T(F): 98.3 (11-06-24 @ 12:17)  HR: 62 (11-06-24 @ 12:17)  BP: 129/86 (11-06-24 @ 12:17)  RR: 18 (11-06-24 @ 12:17)  SpO2: 96% (11-06-24 @ 12:17)  Wt(kg): --    PHYSICAL EXAM:    Constitutional: NAD  Eyes: EOMI, sclera non-icteric  Neck: supple, no masses, no JVD  Respiratory: CTA b/l, good air entry b/l  Cardiovascular: RRR, no M/R/G  Gastrointestinal: soft, NTND, no masses palpable, + BS  Extremities: right great toe swelling with ?tophi  Neurological: AAOx3      MEDICATIONS  (STANDING):  atorvastatin 80 milliGRAM(s) Oral daily  colchicine 0.6 milliGRAM(s) Oral two times a day  heparin  Infusion.  Unit(s)/Hr (18 mL/Hr) IV Continuous <Continuous>  ibuprofen  Tablet. 400 milliGRAM(s) Oral three times a day  influenza  Vaccine (HIGH DOSE) 0.5 milliLiter(s) IntraMuscular once  latanoprost 0.005% Ophthalmic Solution 1 Drop(s) Both EYES at bedtime  timolol 0.5% Solution 1 Drop(s) Right EYE two times a day    MEDICATIONS  (PRN):  acetaminophen     Tablet .. 650 milliGRAM(s) Oral every 6 hours PRN Temp greater or equal to 38C (100.4F), Mild Pain (1 - 3)  aluminum hydroxide/magnesium hydroxide/simethicone Suspension 30 milliLiter(s) Oral every 4 hours PRN Dyspepsia  heparin   Injectable 4000 Unit(s) IV Push every 6 hours PRN For aPTT between 40 - 57  heparin   Injectable 8000 Unit(s) IV Push every 6 hours PRN For aPTT less than 40  melatonin 3 milliGRAM(s) Oral at bedtime PRN Insomnia  ondansetron Injectable 4 milliGRAM(s) IV Push every 8 hours PRN Nausea and/or Vomiting      Allergies    No Known Allergies    Intolerances        LABS:                        14.2   10.13 )-----------( 229      ( 06 Nov 2024 06:42 )             43.1     11-06    137  |  106  |  13  ----------------------------<  101[H]  3.9   |  19[L]  |  1.08    Ca    8.9      06 Nov 2024 06:42      PTT - ( 06 Nov 2024 06:42 )  PTT:89.5 sec  Urinalysis Basic - ( 06 Nov 2024 06:42 )    Color: x / Appearance: x / SG: x / pH: x  Gluc: 101 mg/dL / Ketone: x  / Bili: x / Urobili: x   Blood: x / Protein: x / Nitrite: x   Leuk Esterase: x / RBC: x / WBC x   Sq Epi: x / Non Sq Epi: x / Bacteria: x        RADIOLOGY & ADDITIONAL TESTS:  Studies reviewed.    ASSESSMENT & PLAN: 
  Overnight Events: NAEO    Review Of Systems: No chest pain, shortness of breath, or palpitations at rest. Able to sit up and urinate without SOB            Current Meds:  acetaminophen     Tablet .. 650 milliGRAM(s) Oral every 6 hours PRN  aluminum hydroxide/magnesium hydroxide/simethicone Suspension 30 milliLiter(s) Oral every 4 hours PRN  aspirin  chewable 81 milliGRAM(s) Oral daily  atorvastatin 80 milliGRAM(s) Oral daily  heparin   Injectable 4000 Unit(s) IV Push every 6 hours PRN  heparin   Injectable 8000 Unit(s) IV Push every 6 hours PRN  heparin  Infusion.  Unit(s)/Hr IV Continuous <Continuous>  influenza  Vaccine (HIGH DOSE) 0.5 milliLiter(s) IntraMuscular once  latanoprost 0.005% Ophthalmic Solution 1 Drop(s) Both EYES at bedtime  melatonin 3 milliGRAM(s) Oral at bedtime PRN  ondansetron Injectable 4 milliGRAM(s) IV Push every 8 hours PRN  timolol 0.5% Solution 1 Drop(s) Right EYE two times a day      Vitals:  T(F): 98 (11-03), Max: 98 (11-03)  HR: 71 (11-03) (60 - 73)  BP: 120/82 (11-03) (108/75 - 126/80)  RR: 18 (11-03)  SpO2: 96% (11-03)  I&O's Summary    02 Nov 2024 08:01  -  03 Nov 2024 07:00  --------------------------------------------------------  IN: 144 mL / OUT: 0 mL / NET: 144 mL        Physical Exam:  GEN: comfortable appearing, lying in bed in NAD  HEENT: NCAT, MMM  CV: Regular S1, S2, no m/r/g  RESP: CTAB  ABD: Soft, NTND, +BS  EXT: No LE edema, WWP, pulses palpable throughout  NEURO: No focal deficits, AOx3  SKIN:  No rashes                          14.2   9.72  )-----------( 168      ( 03 Nov 2024 07:27 )             43.4     11-03    140  |  109[H]  |  12  ----------------------------<  96  4.5   |  19[L]  |  1.16    Ca    8.8      03 Nov 2024 07:27  Mg     2.2     11-01    TPro  6.9  /  Alb  3.7  /  TBili  0.6  /  DBili  x   /  AST  44[H]  /  ALT  35  /  AlkPhos  77  11-01    PT/INR - ( 01 Nov 2024 21:42 )   PT: 11.6 sec;   INR: 1.01 ratio         PTT - ( 03 Nov 2024 07:27 )  PTT:59.5 sec  CARDIAC MARKERS ( 02 Nov 2024 16:21 )  48 ng/L / x     / x     / x     / x     / x      CARDIAC MARKERS ( 02 Nov 2024 09:23 )  58 ng/L / x     / x     / x     / x     / 5.8 ng/mL  CARDIAC MARKERS ( 02 Nov 2024 04:19 )  74 ng/L / x     / x     / x     / x     / 6.9 ng/mL  CARDIAC MARKERS ( 01 Nov 2024 21:42 )  160 ng/L / x     / x     / x     / x     / x                          
INTERVAL HPI/OVERNIGHT EVENTS:  Patient S&E at bedside. No o/n events, patient resting comfortably. Reports right foot pain and right great toe swelling (?tophi). Denies any overt bleeding.    VITAL SIGNS:  T(F): 98.1 (11-05-24 @ 11:58)  HR: 80 (11-05-24 @ 11:58)  BP: 122/79 (11-05-24 @ 11:58)  RR: 18 (11-05-24 @ 11:58)  SpO2: 97% (11-05-24 @ 11:58)  Wt(kg): --    PHYSICAL EXAM:    Constitutional: NAD  Eyes: EOMI, sclera non-icteric  Neck: supple, no masses, no JVD  Respiratory: CTA b/l, good air entry b/l  Cardiovascular: RRR, no M/R/G  Gastrointestinal: soft, NTND, no masses palpable, + BS  Extremities: right great toe swelling with ?tophi  Neurological: AAOx3      MEDICATIONS  (STANDING):  atorvastatin 80 milliGRAM(s) Oral daily  heparin  Infusion.  Unit(s)/Hr (18 mL/Hr) IV Continuous <Continuous>  influenza  Vaccine (HIGH DOSE) 0.5 milliLiter(s) IntraMuscular once  latanoprost 0.005% Ophthalmic Solution 1 Drop(s) Both EYES at bedtime  timolol 0.5% Solution 1 Drop(s) Right EYE two times a day    MEDICATIONS  (PRN):  acetaminophen     Tablet .. 650 milliGRAM(s) Oral every 6 hours PRN Temp greater or equal to 38C (100.4F), Mild Pain (1 - 3)  aluminum hydroxide/magnesium hydroxide/simethicone Suspension 30 milliLiter(s) Oral every 4 hours PRN Dyspepsia  heparin   Injectable 4000 Unit(s) IV Push every 6 hours PRN For aPTT between 40 - 57  heparin   Injectable 8000 Unit(s) IV Push every 6 hours PRN For aPTT less than 40  melatonin 3 milliGRAM(s) Oral at bedtime PRN Insomnia  ondansetron Injectable 4 milliGRAM(s) IV Push every 8 hours PRN Nausea and/or Vomiting      Allergies    No Known Allergies    Intolerances        LABS:                        14.0   8.99  )-----------( 231      ( 05 Nov 2024 07:55 )             42.2     11-05    141  |  106  |  11  ----------------------------<  89  3.6   |  21[L]  |  1.06    Ca    8.9      05 Nov 2024 07:55  Mg     2.0     11-04      PT/INR - ( 04 Nov 2024 04:24 )   PT: 12.6 sec;   INR: 1.10 ratio         PTT - ( 05 Nov 2024 07:55 )  PTT:73.0 sec  Urinalysis Basic - ( 05 Nov 2024 07:55 )    Color: x / Appearance: x / SG: x / pH: x  Gluc: 89 mg/dL / Ketone: x  / Bili: x / Urobili: x   Blood: x / Protein: x / Nitrite: x   Leuk Esterase: x / RBC: x / WBC x   Sq Epi: x / Non Sq Epi: x / Bacteria: x        RADIOLOGY & ADDITIONAL TESTS:  Studies reviewed.    ASSESSMENT & PLAN: 
Carmelina Mcghee DO  Available on [employer] TEAMS    Patient is a 69y old  Male who presents with a chief complaint of NSTEMI (02 Nov 2024 11:00)      INTERVAL HPI/OVERNIGHT EVENTS: Patient seen and examined at bedside. No overnight events. On Heparin gtt. Tolerating diet. Denies fever, chills, chest pain, shortness of breath, abdominal pain, nausea/vomiting, headache.      MEDICATIONS  (STANDING):  aspirin  chewable 81 milliGRAM(s) Oral daily  atorvastatin 80 milliGRAM(s) Oral daily  heparin  Infusion.  Unit(s)/Hr (18 mL/Hr) IV Continuous <Continuous>  influenza  Vaccine (HIGH DOSE) 0.5 milliLiter(s) IntraMuscular once  latanoprost 0.005% Ophthalmic Solution 1 Drop(s) Both EYES at bedtime  timolol 0.5% Solution 1 Drop(s) Right EYE two times a day    MEDICATIONS  (PRN):  acetaminophen     Tablet .. 650 milliGRAM(s) Oral every 6 hours PRN Temp greater or equal to 38C (100.4F), Mild Pain (1 - 3)  aluminum hydroxide/magnesium hydroxide/simethicone Suspension 30 milliLiter(s) Oral every 4 hours PRN Dyspepsia  heparin   Injectable 8000 Unit(s) IV Push every 6 hours PRN For aPTT less than 40  heparin   Injectable 4000 Unit(s) IV Push every 6 hours PRN For aPTT between 40 - 57  melatonin 3 milliGRAM(s) Oral at bedtime PRN Insomnia  ondansetron Injectable 4 milliGRAM(s) IV Push every 8 hours PRN Nausea and/or Vomiting      Allergies    No Known Allergies    Intolerances        REVIEW OF SYSTEMS:  All other review of systems is negative unless indicated above      Vital Signs Last 24 Hrs  T(C): 36.4 (03 Nov 2024 05:45), Max: 36.6 (02 Nov 2024 12:07)  T(F): 97.5 (03 Nov 2024 05:45), Max: 97.9 (02 Nov 2024 12:07)  HR: 70 (03 Nov 2024 05:45) (60 - 73)  BP: 117/79 (03 Nov 2024 05:45) (108/75 - 126/80)  BP(mean): --  RR: 18 (03 Nov 2024 05:45) (18 - 18)  SpO2: 97% (03 Nov 2024 05:45) (95% - 97%)    Parameters below as of 03 Nov 2024 05:45  Patient On (Oxygen Delivery Method): room air        PHYSICAL EXAM:  GENERAL: NAD  HEENT:  anicteric, moist mucous membranes  CHEST/LUNG:  CTA b/l, no rales, wheezes, or rhonchi  HEART:  RRR, S1, S2  ABDOMEN:  BS+, soft, nontender, nondistended  EXTREMITIES: no edema, cyanosis, or calf tenderness  NERVOUS SYSTEM: answers questions and follows commands appropriately    LABS:                        14.2   9.72  )-----------( 168      ( 03 Nov 2024 07:27 )             43.4     CBC Full  -  ( 03 Nov 2024 07:27 )  WBC Count : 9.72 K/uL  Hemoglobin : 14.2 g/dL  Hematocrit : 43.4 %  Platelet Count - Automated : 168 K/uL  Mean Cell Volume : 104.3 fl  Mean Cell Hemoglobin : 34.1 pg  Mean Cell Hemoglobin Concentration : 32.7 g/dL  Auto Neutrophil # : x  Auto Lymphocyte # : x  Auto Monocyte # : x  Auto Eosinophil # : x  Auto Basophil # : x  Auto Neutrophil % : x  Auto Lymphocyte % : x  Auto Monocyte % : x  Auto Eosinophil % : x  Auto Basophil % : x    03 Nov 2024 07:27    140    |  109    |  12     ----------------------------<  96     4.5     |  19     |  1.16     Ca    8.8        03 Nov 2024 07:27      PT/INR - ( 01 Nov 2024 21:42 )   PT: 11.6 sec;   INR: 1.01 ratio         PTT - ( 03 Nov 2024 07:27 )  PTT:59.5 sec  Urinalysis Basic - ( 03 Nov 2024 07:27 )    Color: x / Appearance: x / SG: x / pH: x  Gluc: 96 mg/dL / Ketone: x  / Bili: x / Urobili: x   Blood: x / Protein: x / Nitrite: x   Leuk Esterase: x / RBC: x / WBC x   Sq Epi: x / Non Sq Epi: x / Bacteria: x      CAPILLARY BLOOD GLUCOSE              RADIOLOGY & ADDITIONAL TESTS:    Personally reviewed.     Consultant(s) Notes Reviewed:  [x] YES  [ ] NO    
    Patient is a 69y old  Male who presents with a chief complaint of NSTEMI (05 Nov 2024 12:27)        SUBJECTIVE / OVERNIGHT EVENTS: Patient had no acute events overnight. Patient seen and examined at bedside this morning. Endorses left greater toe pain which started last night. Last gout flare few years ago.     ROS: [ - ] Fever [ - ] Chills [ - ] Nausea/Vomiting [ - ] Chest Pain [ - ] Shortness of breath     MEDICATIONS  (STANDING):  atorvastatin 80 milliGRAM(s) Oral daily  colchicine 0.6 milliGRAM(s) Oral once  heparin  Infusion.  Unit(s)/Hr (18 mL/Hr) IV Continuous <Continuous>  influenza  Vaccine (HIGH DOSE) 0.5 milliLiter(s) IntraMuscular once  latanoprost 0.005% Ophthalmic Solution 1 Drop(s) Both EYES at bedtime  timolol 0.5% Solution 1 Drop(s) Right EYE two times a day    MEDICATIONS  (PRN):  acetaminophen     Tablet .. 650 milliGRAM(s) Oral every 6 hours PRN Temp greater or equal to 38C (100.4F), Mild Pain (1 - 3)  aluminum hydroxide/magnesium hydroxide/simethicone Suspension 30 milliLiter(s) Oral every 4 hours PRN Dyspepsia  heparin   Injectable 4000 Unit(s) IV Push every 6 hours PRN For aPTT between 40 - 57  heparin   Injectable 8000 Unit(s) IV Push every 6 hours PRN For aPTT less than 40  melatonin 3 milliGRAM(s) Oral at bedtime PRN Insomnia  ondansetron Injectable 4 milliGRAM(s) IV Push every 8 hours PRN Nausea and/or Vomiting      Vital Signs Last 24 Hrs  T(C): 36.7 (05 Nov 2024 11:58), Max: 36.7 (05 Nov 2024 11:58)  T(F): 98.1 (05 Nov 2024 11:58), Max: 98.1 (05 Nov 2024 11:58)  HR: 80 (05 Nov 2024 11:58) (60 - 80)  BP: 122/79 (05 Nov 2024 11:58) (119/78 - 138/84)  BP(mean): --  RR: 18 (05 Nov 2024 11:58) (18 - 18)  SpO2: 97% (05 Nov 2024 11:58) (92% - 97%)    Parameters below as of 05 Nov 2024 11:58  Patient On (Oxygen Delivery Method): room air      CAPILLARY BLOOD GLUCOSE      POCT Blood Glucose.: 103 mg/dL (04 Nov 2024 22:09)  POCT Blood Glucose.: 76 mg/dL (04 Nov 2024 21:31)    I&O's Summary    04 Nov 2024 07:01  -  05 Nov 2024 07:00  --------------------------------------------------------  IN: 612 mL / OUT: 1875 mL / NET: -1263 mL    05 Nov 2024 07:01  -  05 Nov 2024 16:09  --------------------------------------------------------  IN: 360 mL / OUT: 550 mL / NET: -190 mL        PHYSICAL EXAM  GENERAL: NAD, lying comfortably in bed   HEENT:  Atraumatic, Normocephalic, EOMI, conjunctiva and sclera clear, no LAD  CHEST/LUNG: Clear to auscultation bilaterally; No wheeze  HEART: RRR, S1 and S2 No murmurs, rubs, or gallops  ABDOMEN: Soft, Nontender, Nondistended; Bowel sounds present  EXTREMITIES:  2+ Peripheral Pulses, No clubbing, cyanosis, or edema. left greater toe tenderness at the base. no erythema  NEURO: AAOx3, non-focal  SKIN: No rashes or lesions    LABS:                        14.0   8.99  )-----------( 231      ( 05 Nov 2024 07:55 )             42.2     11-05    141  |  106  |  11  ----------------------------<  89  3.6   |  21[L]  |  1.06    Ca    8.9      05 Nov 2024 07:55  Mg     2.0     11-04      PT/INR - ( 04 Nov 2024 04:24 )   PT: 12.6 sec;   INR: 1.10 ratio         PTT - ( 05 Nov 2024 07:58 )  PTT:73.0 sec      Urinalysis Basic - ( 05 Nov 2024 07:55 )    Color: x / Appearance: x / SG: x / pH: x  Gluc: 89 mg/dL / Ketone: x  / Bili: x / Urobili: x   Blood: x / Protein: x / Nitrite: x   Leuk Esterase: x / RBC: x / WBC x   Sq Epi: x / Non Sq Epi: x / Bacteria: x          RADIOLOGY & ADDITIONAL TESTS:      Labs Personally Reviewed  Imaging Personally Reviewed  Consultant(s) Notes Reviewed   
    Patient is a 69y old  Male who presents with a chief complaint of NSTEMI (06 Nov 2024 11:45)        SUBJECTIVE / OVERNIGHT EVENTS: Patient had no acute events overnight. Patient seen and examined at bedside this morning. Right foot pain improved after starting colchicine. Will transition to eliquis. Patient denies sob and inquiring on when he can go home.     ROS: [ - ] Fever [ - ] Chills [ - ] Nausea/Vomiting [ - ] Chest Pain [ - ] Shortness of breath     MEDICATIONS  (STANDING):  atorvastatin 80 milliGRAM(s) Oral daily  colchicine 0.6 milliGRAM(s) Oral two times a day  heparin  Infusion.  Unit(s)/Hr (18 mL/Hr) IV Continuous <Continuous>  ibuprofen  Tablet. 400 milliGRAM(s) Oral three times a day  influenza  Vaccine (HIGH DOSE) 0.5 milliLiter(s) IntraMuscular once  latanoprost 0.005% Ophthalmic Solution 1 Drop(s) Both EYES at bedtime  timolol 0.5% Solution 1 Drop(s) Right EYE two times a day    MEDICATIONS  (PRN):  acetaminophen     Tablet .. 650 milliGRAM(s) Oral every 6 hours PRN Temp greater or equal to 38C (100.4F), Mild Pain (1 - 3)  aluminum hydroxide/magnesium hydroxide/simethicone Suspension 30 milliLiter(s) Oral every 4 hours PRN Dyspepsia  heparin   Injectable 8000 Unit(s) IV Push every 6 hours PRN For aPTT less than 40  heparin   Injectable 4000 Unit(s) IV Push every 6 hours PRN For aPTT between 40 - 57  melatonin 3 milliGRAM(s) Oral at bedtime PRN Insomnia  ondansetron Injectable 4 milliGRAM(s) IV Push every 8 hours PRN Nausea and/or Vomiting      Vital Signs Last 24 Hrs  T(C): 36.8 (06 Nov 2024 12:17), Max: 36.9 (05 Nov 2024 21:01)  T(F): 98.3 (06 Nov 2024 12:17), Max: 98.5 (05 Nov 2024 21:01)  HR: 62 (06 Nov 2024 12:17) (58 - 70)  BP: 129/86 (06 Nov 2024 12:17) (120/83 - 135/92)  BP(mean): --  RR: 18 (06 Nov 2024 12:17) (18 - 18)  SpO2: 96% (06 Nov 2024 12:17) (94% - 97%)    Parameters below as of 06 Nov 2024 12:17  Patient On (Oxygen Delivery Method): room air      CAPILLARY BLOOD GLUCOSE        I&O's Summary    05 Nov 2024 07:01  -  06 Nov 2024 07:00  --------------------------------------------------------  IN: 600 mL / OUT: 670 mL / NET: -70 mL        PHYSICAL EXAM  GENERAL: NAD, lying comfortably in bed   HEENT:  Atraumatic, Normocephalic, EOMI, conjunctiva and sclera clear, no LAD  CHEST/LUNG: Clear to auscultation bilaterally; No wheeze  HEART: RRR, S1 and S2 No murmurs, rubs, or gallops  ABDOMEN: Soft, Nontender, Nondistended; Bowel sounds present  EXTREMITIES:  2+ Peripheral Pulses, No clubbing, cyanosis, or edema. left greater toe tenderness at the base resolved. no erythema  NEURO: AAOx3, non-focal  SKIN: No rashes or lesions    LABS:                        14.2   10.13 )-----------( 229      ( 06 Nov 2024 06:42 )             43.1     11-06    137  |  106  |  13  ----------------------------<  101[H]  3.9   |  19[L]  |  1.08    Ca    8.9      06 Nov 2024 06:42      PTT - ( 06 Nov 2024 06:42 )  PTT:89.5 sec      Urinalysis Basic - ( 06 Nov 2024 06:42 )    Color: x / Appearance: x / SG: x / pH: x  Gluc: 101 mg/dL / Ketone: x  / Bili: x / Urobili: x   Blood: x / Protein: x / Nitrite: x   Leuk Esterase: x / RBC: x / WBC x   Sq Epi: x / Non Sq Epi: x / Bacteria: x          RADIOLOGY & ADDITIONAL TESTS:  < from: Xray Foot AP + Lateral, Right (11.06.24 @ 12:54) >  IMPRESSION:  Stable intact pre-existing indwelling long narrow proximally and distally   threaded arthrodesis screw traversing hallux IP articulation and small   distal 1st metatarsal osteotomy screw. Redemonstrated complete hallux   osseous fusion across hallux IP articulation and slight bony hypertrophy   along medial hallux IP joint margin.    Chronic small ossicle adjacent to lateral 1st MTP articular margin.   Otherwise, no dislocations or acute appearing fractures.    Tarsometatarsal alignment maintained without evidence for a Lisfranc   injury.    1st MTP osteoarthritic changes. Preserved remaining joint spaces and no   joint margin erosions.    No calcaneal spurring.    No discrete lytic or blastic lesions.    --- End of Report ---    < end of copied text >      Labs Personally Reviewed  Imaging Personally Reviewed  Consultant(s) Notes Reviewed   
    Patient is a 69y old  Male who presents with a chief complaint of NSTEMI (04 Nov 2024 14:43)        SUBJECTIVE / OVERNIGHT EVENTS: Patient had no acute events overnight. Patient seen and examined at bedside this morning. No acute complaints. Denies sob or chest pain.     ROS: [ - ] Fever [ - ] Chills [ - ] Nausea/Vomiting [ - ] Chest Pain [ - ] Shortness of breath     MEDICATIONS  (STANDING):  atorvastatin 80 milliGRAM(s) Oral daily  heparin  Infusion.  Unit(s)/Hr (18 mL/Hr) IV Continuous <Continuous>  influenza  Vaccine (HIGH DOSE) 0.5 milliLiter(s) IntraMuscular once  latanoprost 0.005% Ophthalmic Solution 1 Drop(s) Both EYES at bedtime  timolol 0.5% Solution 1 Drop(s) Right EYE two times a day    MEDICATIONS  (PRN):  acetaminophen     Tablet .. 650 milliGRAM(s) Oral every 6 hours PRN Temp greater or equal to 38C (100.4F), Mild Pain (1 - 3)  aluminum hydroxide/magnesium hydroxide/simethicone Suspension 30 milliLiter(s) Oral every 4 hours PRN Dyspepsia  heparin   Injectable 4000 Unit(s) IV Push every 6 hours PRN For aPTT between 40 - 57  heparin   Injectable 8000 Unit(s) IV Push every 6 hours PRN For aPTT less than 40  melatonin 3 milliGRAM(s) Oral at bedtime PRN Insomnia  ondansetron Injectable 4 milliGRAM(s) IV Push every 8 hours PRN Nausea and/or Vomiting      Vital Signs Last 24 Hrs  T(C): 36.6 (04 Nov 2024 12:10), Max: 36.6 (03 Nov 2024 21:19)  T(F): 97.9 (04 Nov 2024 12:10), Max: 97.9 (03 Nov 2024 21:19)  HR: 60 (04 Nov 2024 12:10) (59 - 61)  BP: 119/81 (04 Nov 2024 12:10) (119/81 - 128/84)  BP(mean): --  RR: 18 (04 Nov 2024 12:10) (18 - 18)  SpO2: 97% (04 Nov 2024 12:10) (96% - 97%)    Parameters below as of 04 Nov 2024 12:10  Patient On (Oxygen Delivery Method): room air      CAPILLARY BLOOD GLUCOSE        I&O's Summary    03 Nov 2024 07:01  -  04 Nov 2024 07:00  --------------------------------------------------------  IN: 802 mL / OUT: 850 mL / NET: -48 mL    04 Nov 2024 07:01  -  04 Nov 2024 15:34  --------------------------------------------------------  IN: 0 mL / OUT: 1000 mL / NET: -1000 mL        PHYSICAL EXAM  GENERAL: NAD, lying comfortably in bed   HEENT:  Atraumatic, Normocephalic, EOMI, conjunctiva and sclera clear, no LAD  CHEST/LUNG: Clear to auscultation bilaterally; No wheeze  HEART: RRR, S1 and S2 No murmurs, rubs, or gallops  ABDOMEN: Soft, Nontender, Nondistended; Bowel sounds present  EXTREMITIES:  2+ Peripheral Pulses, No clubbing, cyanosis, or edema  NEURO: AAOx3, non-focal  SKIN: No rashes or lesions    LABS:                        13.5   9.28  )-----------( 188      ( 04 Nov 2024 04:24 )             40.6     11-04    138  |  107  |  15  ----------------------------<  109[H]  3.9   |  20[L]  |  1.11    Ca    9.1      04 Nov 2024 04:25  Mg     2.0     11-04      PT/INR - ( 04 Nov 2024 04:24 )   PT: 12.6 sec;   INR: 1.10 ratio         PTT - ( 04 Nov 2024 11:50 )  PTT:105.7 sec      Urinalysis Basic - ( 04 Nov 2024 04:25 )    Color: x / Appearance: x / SG: x / pH: x  Gluc: 109 mg/dL / Ketone: x  / Bili: x / Urobili: x   Blood: x / Protein: x / Nitrite: x   Leuk Esterase: x / RBC: x / WBC x   Sq Epi: x / Non Sq Epi: x / Bacteria: x          RADIOLOGY & ADDITIONAL TESTS:      Labs Personally Reviewed  Imaging Personally Reviewed  Consultant(s) Notes Reviewed

## 2024-11-06 NOTE — PROGRESS NOTE ADULT - PROBLEM SELECTOR PROBLEM 1
Saddle pulmonary embolus
NSTEMI (non-ST elevation myocardial infarction)
Saddle pulmonary embolus
Saddle pulmonary embolus

## 2024-11-06 NOTE — PROGRESS NOTE ADULT - PROBLEM SELECTOR PLAN 5
- Diet: DASH diet   - DVT prophylaxis; heparin gtt
- Diet: DASH diet   - DVT prophylaxis; Eliquis
- Diet: DASH diet   - DVT prophylaxis; heparin gtt

## 2024-11-06 NOTE — PROGRESS NOTE ADULT - PROBLEM SELECTOR PROBLEM 2
NSTEMI (non-ST elevation myocardial infarction)
Malaise

## 2024-11-06 NOTE — PROGRESS NOTE ADULT - PROBLEM SELECTOR PLAN 3
- c/w home regimen: timolol 0.5%, latanoprost 0.005%
- patient reports generalized weakness, postural dizziness --> suspect 2/2 NSTEMI  - reviewed CT head No acute intracranial hemorrhage, mass effect, or midline shift.  - UA negative  - self report urgent care Flu/COVID/RSV negative within 2 weeks   - Flu/COVID/RSV neg
- patient reports generalized weakness, postural dizziness --> suspect 2/2 NSTEMI  - reviewed CT head No acute intracranial hemorrhage, mass effect, or midline shift.  - check orthostatics  - UA negative  - self report urgent care Flu/COVID/RSV negative within 2 weeks   - Flu/COVID/RSV neg
- patient reports generalized weakness, postural dizziness --> suspect 2/2 NSTEMI  - reviewed CT head No acute intracranial hemorrhage, mass effect, or midline shift.  - can check orthostatic BP once cardiac workup is complete   - UA negative  - self report urgent care Flu/COVID/RSV negative within 2 weeks   - Flu/COVID/RSV neg

## 2024-11-07 ENCOUNTER — TRANSCRIPTION ENCOUNTER (OUTPATIENT)
Age: 69
End: 2024-11-07

## 2024-11-07 VITALS
TEMPERATURE: 98 F | DIASTOLIC BLOOD PRESSURE: 76 MMHG | OXYGEN SATURATION: 94 % | HEART RATE: 75 BPM | RESPIRATION RATE: 18 BRPM | SYSTOLIC BLOOD PRESSURE: 110 MMHG

## 2024-11-07 LAB
ANION GAP SERPL CALC-SCNC: 10 MMOL/L — SIGNIFICANT CHANGE UP (ref 5–17)
APTT BLD: 34.1 SEC — SIGNIFICANT CHANGE UP (ref 24.5–35.6)
BUN SERPL-MCNC: 10 MG/DL — SIGNIFICANT CHANGE UP (ref 7–23)
CALCIUM SERPL-MCNC: 9.1 MG/DL — SIGNIFICANT CHANGE UP (ref 8.4–10.5)
CHLORIDE SERPL-SCNC: 107 MMOL/L — SIGNIFICANT CHANGE UP (ref 96–108)
CO2 SERPL-SCNC: 18 MMOL/L — LOW (ref 22–31)
CREAT SERPL-MCNC: 0.97 MG/DL — SIGNIFICANT CHANGE UP (ref 0.5–1.3)
EGFR: 86 ML/MIN/1.73M2 — SIGNIFICANT CHANGE UP
GLUCOSE SERPL-MCNC: 90 MG/DL — SIGNIFICANT CHANGE UP (ref 70–99)
HCT VFR BLD CALC: 43.4 % — SIGNIFICANT CHANGE UP (ref 39–50)
HGB BLD-MCNC: 14.3 G/DL — SIGNIFICANT CHANGE UP (ref 13–17)
MCHC RBC-ENTMCNC: 32.9 G/DL — SIGNIFICANT CHANGE UP (ref 32–36)
MCHC RBC-ENTMCNC: 33.5 PG — SIGNIFICANT CHANGE UP (ref 27–34)
MCV RBC AUTO: 101.6 FL — HIGH (ref 80–100)
NRBC # BLD: 0 /100 WBCS — SIGNIFICANT CHANGE UP (ref 0–0)
PLATELET # BLD AUTO: 239 K/UL — SIGNIFICANT CHANGE UP (ref 150–400)
POTASSIUM SERPL-MCNC: 5.9 MMOL/L — HIGH (ref 3.5–5.3)
POTASSIUM SERPL-SCNC: 5.9 MMOL/L — HIGH (ref 3.5–5.3)
RBC # BLD: 4.27 M/UL — SIGNIFICANT CHANGE UP (ref 4.2–5.8)
RBC # FLD: 11.8 % — SIGNIFICANT CHANGE UP (ref 10.3–14.5)
SODIUM SERPL-SCNC: 135 MMOL/L — SIGNIFICANT CHANGE UP (ref 135–145)
WBC # BLD: 8.53 K/UL — SIGNIFICANT CHANGE UP (ref 3.8–10.5)
WBC # FLD AUTO: 8.53 K/UL — SIGNIFICANT CHANGE UP (ref 3.8–10.5)

## 2024-11-07 PROCEDURE — 82803 BLOOD GASES ANY COMBINATION: CPT

## 2024-11-07 PROCEDURE — 85014 HEMATOCRIT: CPT

## 2024-11-07 PROCEDURE — 80061 LIPID PANEL: CPT

## 2024-11-07 PROCEDURE — 71045 X-RAY EXAM CHEST 1 VIEW: CPT

## 2024-11-07 PROCEDURE — 71275 CT ANGIOGRAPHY CHEST: CPT | Mod: MC

## 2024-11-07 PROCEDURE — 83605 ASSAY OF LACTIC ACID: CPT

## 2024-11-07 PROCEDURE — 93306 TTE W/DOPPLER COMPLETE: CPT

## 2024-11-07 PROCEDURE — 86850 RBC ANTIBODY SCREEN: CPT

## 2024-11-07 PROCEDURE — 86147 CARDIOLIPIN ANTIBODY EA IG: CPT

## 2024-11-07 PROCEDURE — 93005 ELECTROCARDIOGRAM TRACING: CPT

## 2024-11-07 PROCEDURE — 84295 ASSAY OF SERUM SODIUM: CPT

## 2024-11-07 PROCEDURE — 96374 THER/PROPH/DIAG INJ IV PUSH: CPT

## 2024-11-07 PROCEDURE — 85027 COMPLETE CBC AUTOMATED: CPT

## 2024-11-07 PROCEDURE — 83880 ASSAY OF NATRIURETIC PEPTIDE: CPT

## 2024-11-07 PROCEDURE — 82330 ASSAY OF CALCIUM: CPT

## 2024-11-07 PROCEDURE — 99239 HOSP IP/OBS DSCHRG MGMT >30: CPT

## 2024-11-07 PROCEDURE — 70450 CT HEAD/BRAIN W/O DYE: CPT | Mod: MC

## 2024-11-07 PROCEDURE — 73620 X-RAY EXAM OF FOOT: CPT

## 2024-11-07 PROCEDURE — 85025 COMPLETE CBC W/AUTO DIFF WBC: CPT

## 2024-11-07 PROCEDURE — 85610 PROTHROMBIN TIME: CPT

## 2024-11-07 PROCEDURE — 82947 ASSAY GLUCOSE BLOOD QUANT: CPT

## 2024-11-07 PROCEDURE — 85018 HEMOGLOBIN: CPT

## 2024-11-07 PROCEDURE — 87637 SARSCOV2&INF A&B&RSV AMP PRB: CPT

## 2024-11-07 PROCEDURE — 80048 BASIC METABOLIC PNL TOTAL CA: CPT

## 2024-11-07 PROCEDURE — 84484 ASSAY OF TROPONIN QUANT: CPT

## 2024-11-07 PROCEDURE — 83735 ASSAY OF MAGNESIUM: CPT

## 2024-11-07 PROCEDURE — 86901 BLOOD TYPING SEROLOGIC RH(D): CPT

## 2024-11-07 PROCEDURE — 99285 EMERGENCY DEPT VISIT HI MDM: CPT

## 2024-11-07 PROCEDURE — 82550 ASSAY OF CK (CPK): CPT

## 2024-11-07 PROCEDURE — 86146 BETA-2 GLYCOPROTEIN ANTIBODY: CPT

## 2024-11-07 PROCEDURE — 85732 THROMBOPLASTIN TIME PARTIAL: CPT

## 2024-11-07 PROCEDURE — 82435 ASSAY OF BLOOD CHLORIDE: CPT

## 2024-11-07 PROCEDURE — 96376 TX/PRO/DX INJ SAME DRUG ADON: CPT

## 2024-11-07 PROCEDURE — 82962 GLUCOSE BLOOD TEST: CPT

## 2024-11-07 PROCEDURE — 84443 ASSAY THYROID STIM HORMONE: CPT

## 2024-11-07 PROCEDURE — 81003 URINALYSIS AUTO W/O SCOPE: CPT

## 2024-11-07 PROCEDURE — 84132 ASSAY OF SERUM POTASSIUM: CPT

## 2024-11-07 PROCEDURE — 0241U: CPT

## 2024-11-07 PROCEDURE — 85613 RUSSELL VIPER VENOM DILUTED: CPT

## 2024-11-07 PROCEDURE — 83036 HEMOGLOBIN GLYCOSYLATED A1C: CPT

## 2024-11-07 PROCEDURE — 86900 BLOOD TYPING SEROLOGIC ABO: CPT

## 2024-11-07 PROCEDURE — 36415 COLL VENOUS BLD VENIPUNCTURE: CPT

## 2024-11-07 PROCEDURE — 82553 CREATINE MB FRACTION: CPT

## 2024-11-07 PROCEDURE — 93970 EXTREMITY STUDY: CPT

## 2024-11-07 PROCEDURE — 85730 THROMBOPLASTIN TIME PARTIAL: CPT

## 2024-11-07 PROCEDURE — 80053 COMPREHEN METABOLIC PANEL: CPT

## 2024-11-07 RX ADMIN — Medication 80 MILLIGRAM(S): at 11:21

## 2024-11-07 RX ADMIN — Medication 1 DROP(S): at 06:16

## 2024-11-07 RX ADMIN — APIXABAN 10 MILLIGRAM(S): 5 TABLET, FILM COATED ORAL at 06:16

## 2024-11-07 RX ADMIN — Medication 0.6 MILLIGRAM(S): at 06:16

## 2024-11-07 RX ADMIN — Medication 1 DROP(S): at 06:17

## 2024-11-07 NOTE — DISCHARGE NOTE NURSING/CASE MANAGEMENT/SOCIAL WORK - NSDCPEFALRISK_GEN_ALL_CORE
For information on Fall & Injury Prevention, visit: [email or web address] OR  [email or web address] OR  https://www.cdc.gov/steadi/patient.html

## 2024-11-07 NOTE — DISCHARGE NOTE PROVIDER - PROVIDER TOKENS
PROVIDER:[TOKEN:[[id number]]] PROVIDER:[TOKEN:[[id number]]],PROVIDER:[TOKEN:[[id number]],FOLLOWUP:[2 weeks]]

## 2024-11-07 NOTE — DISCHARGE NOTE NURSING/CASE MANAGEMENT/SOCIAL WORK - PATIENT PORTAL LINK FT
You can access the FollowMyHealth Patient Portal offered by [facility] by registering at the following website: [email or web address]. By joining [employer]’s FollowMyHealth portal, you will also be able to view your health information using other applications (apps) compatible with our system.

## 2024-11-07 NOTE — PHARMACOTHERAPY INTERVENTION NOTE - COMMENTS
Counseled patient on the following discharge medications names (brand/generic), indication, and possible side effects:    atorvastatin 80 mg oral tablet: 1 tab(s) orally once a day  colchicine 0.6 mg oral tablet: 1 tab(s) orally 2 times a day  Eliquis Starter Pack for Treatment of DVT and PE 5 mg oral tablet: 1 tab(s) orally 2 times a day    Patient was provided with a medication card for their new medication. Patient questions and concerns were answered and addressed. Patient demonstrated understanding.    Marline Dye, PharmD, BCPS, BCGP  Transitions of care Pharmacist  Available on Teams (preferred)

## 2024-11-07 NOTE — DISCHARGE NOTE PROVIDER - CARE PROVIDERS DIRECT ADDRESSES
[email or web address]

## 2024-11-07 NOTE — DISCHARGE NOTE PROVIDER - NSDCFUSCHEDAPPT_GEN_ALL_CORE_FT
Galo Watters  [facility]  Brynn Chowdary  Scheduled Appointment: 11/25/2024     Preethi Abbasi  [facility]  CARDIOLOGY [address]  Scheduled Appointment: 11/20/2024    Galo Watters  [facility]  [facility]  Scheduled Appointment: 11/25/2024

## 2024-11-07 NOTE — DISCHARGE NOTE PROVIDER - NSFOLLOWUPCLINICS_GEN_ALL_ED_FT
[facility] - Primary Care  Primary Care  [address] [place] [address]  Phone: [phone]  Fax:      [facility] - Primary Care  Primary Care  [address] [place] [address]  Phone: [phone]  Fax:     [facility]  Hematology/Oncology  [address] [place] [id number]  Phone: [phone]  Fax:   Follow Up Time: 1 month

## 2024-11-07 NOTE — DISCHARGE NOTE PROVIDER - ATTENDING DISCHARGE PHYSICAL EXAMINATION:
GENERAL: NAD, lying comfortably in bed   HEENT:  Atraumatic, Normocephalic, EOMI, conjunctiva and sclera clear, no LAD  CHEST/LUNG: Clear to auscultation bilaterally; No wheeze  HEART: RRR, S1 and S2 No murmurs, rubs, or gallops  ABDOMEN: Soft, Nontender, Nondistended; Bowel sounds present  EXTREMITIES:  2+ Peripheral Pulses, No clubbing, cyanosis, or edema. left greater toe tenderness at the base resolved. no erythema  NEURO: AAOx3, non-focal  SKIN: No rashes or lesions

## 2024-11-07 NOTE — DISCHARGE NOTE PROVIDER - NSDCCPCAREPLAN_GEN_ALL_CORE_FT
PRINCIPAL DISCHARGE DIAGNOSIS  Diagnosis: Saddle pulmonary embolus  Assessment and Plan of Treatment: Take your anticoagulation (lovenox, coumadin) as directed.  Follow up with your health care provider within one week. Call for appointment.  If you develop shortness of breath or if your shortness of breath worsens call your Health Care Provider or go to the Emergency Department.      SECONDARY DISCHARGE DIAGNOSES  Diagnosis: NSTEMI (non-ST elevation myocardial infarction)  Assessment and Plan of Treatment: You were found to have an NSTEMI  Cardiac cath was deferred in the setting of PE  Continue medical management     PRINCIPAL DISCHARGE DIAGNOSIS  Diagnosis: Saddle pulmonary embolus  Assessment and Plan of Treatment: Take your anticoagulation Eliquis as directed. Follow up with a hematologist   Follow up with your health care provider within one week. Call for appointment.  If you develop shortness of breath or if your shortness of breath worsens call your Health Care Provider or go to the Emergency Department.      SECONDARY DISCHARGE DIAGNOSES  Diagnosis: Gout  Assessment and Plan of Treatment: You had pain in the right greater toe likely from gout flare. Pain improved after starting colchicine. Please continue colchicine to complete 7 days.

## 2024-11-07 NOTE — DISCHARGE NOTE PROVIDER - CARE PROVIDER_API CALL
Gato Cantu  Interventional Cardiology  [address] [place] [address]  Phone: [phone]  Fax: [phone]  Follow Up Time:    aGto Cantu  Interventional Cardiology  [address] [place] [address]  Phone: [phone]  Fax: [phone]  Follow Up Time:     Cata Crow  Internal Medicine  [address] [place] [address]  Phone: [phone]  Fax: [phone]  Follow Up Time: 2 weeks

## 2024-11-07 NOTE — DISCHARGE NOTE PROVIDER - NSDCHHCONTACT_GEN_ALL_CORE_FT
As certified below, I, or a nurse practitioner or physician assistant working with me, had a face-to-face encounter that meets the physician face-to-face encounter requirements. Post-Care Instructions: I reviewed with the patient in detail post-care instructions. Patient is to avoid sunlight for the next 2 days, and wear sun protection. Patients may expect sunburn like redness, discomfort and scabbing.

## 2024-11-07 NOTE — DISCHARGE NOTE PROVIDER - NSDCMRMEDTOKEN_GEN_ALL_CORE_FT
atorvastatin 80 mg oral tablet: 1 tab(s) orally once a day  colchicine 0.6 mg oral tablet: 1 tab(s) orally 2 times a day  Eliquis Starter Pack for Treatment of DVT and PE 5 mg oral tablet: 1 tab(s) orally 2 times a day  latanoprost 0.005% preservative-free ophthalmic solution: 1 drop(s) to each affected eye once a day (at bedtime)  timolol maleate 0.5% ophthalmic solution: 1 drop(s) in the right eye 2 times a day

## 2024-11-07 NOTE — DISCHARGE NOTE PROVIDER - HOSPITAL COURSE
HPI:  68 yo M w/ PMH gout , glaucoma initially presented to [facility] for malaise, generalized weakness, dyspnea on exertion. found to have NSTEMI, transferred to [facility]   Patient said he started feeling unwell, with generalized weakness, SOB with exertion (climbing one flight of stairs),dry cough since 2 weeks ago he went to urgent care first, s/p Flu/COVID/RSV PRC which was negative, he was also prescribed an inhaler, His symptoms persisted. Yesterday he was sitting down doing his bills, then he got up felt very weak could not get up, and he sat down again. He felt generalized weaknes and lightheaded. He also had a burning sensation at the chest. He got up and went to the bathroom then had bowel urgency leading to he defecated on himself. He cleaned up himself and went  to the hospital. He had another episode of bowel urgency and defecation  on himself again.            While at [facility], ECG noted T wave inversions, trop T HS30--> 132, he received NS bolus 500cc, aspirin 325mg, brilinta 180mg, heparin IV 5000 units on 11/1, started on heparin gtt , transferred to [facility] for NSTEMI.  (02 Nov 2024 11:00)    Hospital Course:  Patient admitted for saddle pulmonary embolus. CT angio with saddle PE with RHS. TTE with EF 65% and septal flattening. +Mascorro's sign. VA duplex + RLE dvt. Heme and vascular cardiology were consulted; no plans for thrombectomy, patient was on a heparin drip during admission and transitioned to eliquis  Patient also found to have NSTEMI; s/p aspirin 325mg x1, brilinta 180mg x1, IV heparin 5000 units x1 in [facility] on 11/1, cath deferred secondary to PE, TTE with EF 65% and Right heart strain from PE  Patient complaining of RLE foot pain during admission; xray was negative for fracture; c/w colchicine 0.6mg bid and motrin 400mg tid for 3 days.  Patient also reporting generalized weakness, postural dizziness --> suspect 2/2 NSTEMI. CT head No acute intracranial hemorrhage, mass effect, or midline shift. Flu/COVID/RSV neg.    Important Medication Changes and Reason:  >Continue eliquis and statin   >Continue colchicine for gout    Active or Pending Issues Requiring Follow-up:  >PCP follow up within one week for further outpatient management  >Cardiology follow up for further outpatient management    Advanced Directives:   [X] Full code  [ ] DNR  [ ] Hospice    Discharge Diagnoses:  >PE  >NSTEMI  >R foot pain     Discharge/dispo/med rec discussed with medical attending Dr. Wright. Patient is medically cleared for discharge with outpatient follow up

## 2024-11-07 NOTE — DISCHARGE NOTE NURSING/CASE MANAGEMENT/SOCIAL WORK - FINANCIAL ASSISTANCE
[facility] provides services at a reduced cost to those who are determined to be eligible through [facility]’s financial assistance program. Information regarding [facility]’s financial assistance program can be found by going to [email or web address] or by calling [phone].

## 2024-11-08 ENCOUNTER — NON-APPOINTMENT (OUTPATIENT)
Age: 69
End: 2024-11-08

## 2024-11-13 ENCOUNTER — APPOINTMENT (OUTPATIENT)
Dept: CARE COORDINATION | Facility: HOME HEALTH | Age: 69
End: 2024-11-13

## 2024-11-13 DIAGNOSIS — M10.9 GOUT, UNSPECIFIED: ICD-10-CM

## 2024-11-13 DIAGNOSIS — I21.4 NON-ST ELEVATION (NSTEMI) MYOCARDIAL INFARCTION: ICD-10-CM

## 2024-11-13 DIAGNOSIS — M1A.0710 IDIOPATHIC CHRONIC GOUT, RIGHT ANKLE AND FOOT, W/OUT TOPHUS (TOPHI): ICD-10-CM

## 2024-11-13 DIAGNOSIS — H40.9 UNSPECIFIED GLAUCOMA: ICD-10-CM

## 2024-11-13 PROCEDURE — 99349 HOME/RES VST EST MOD MDM 40: CPT

## 2024-11-13 NOTE — CHART NOTE - NSCHARTNOTEFT_GEN_A_CORE
Post-Discharge Medication Review: Completed	  	  Patient's preferred pharmacy was updated in [facility]: [facility]	  	  Patient contacted to offer medication counseling post-discharge. Medication reconciliation completed. Per patient, medications include:	  	  1.	atorvastatin 80 mg oral tablet 1 tab(s) orally once a day  2.	colchicine 0.6 mg oral tablet 1 tab(s) orally 2 times a day  3.	Eliquis Starter Pack for Treatment of DVT and PE 5 mg oral tablet 1 tab(s) orally 2 times a day  4.	latanoprost 0.005% preservative-free ophthalmic solution 1 drop(s) to each affected eye once a day (at bedtime)  5.	timolol maleate 0.5% ophthalmic solution 1 drop(s) in the right eye 2 times a day   	  	  Medication name, indication, administration, side effect, and monitoring reviewed for new medications during post discharge counseling visit with patient. Patient demonstrated understanding. Counseling offered for all medications.	  	  Per discharge instructions, patient to take colchicine to complete a 7 day course. Patient was given a 30 days supply of medication at  discharge. Verified with inpatient team, patient to continue colchicine use a BID and follow up with outpatient provider. Patient expressed understanding of counseling.   	  Remberto Abbott, PharmD	  Clinical Pharmacy Specialist, Pharmacy Telehealth Team	  Can be reached via MS Teams or [phone]

## 2024-11-14 VITALS — SYSTOLIC BLOOD PRESSURE: 120 MMHG | HEART RATE: 59 BPM | TEMPERATURE: 98 F | DIASTOLIC BLOOD PRESSURE: 84 MMHG

## 2024-11-14 PROBLEM — I82.409 DVT (DEEP VENOUS THROMBOSIS): Status: ACTIVE | Noted: 2024-11-14

## 2024-11-14 PROBLEM — I21.4 NSTEMI, INITIAL EPISODE OF CARE: Status: ACTIVE | Noted: 2024-11-14

## 2024-11-14 PROBLEM — E78.5 HLD (HYPERLIPIDEMIA): Status: ACTIVE | Noted: 2024-11-14

## 2024-11-14 PROBLEM — H40.9 GLAUCOMA: Status: ACTIVE | Noted: 2024-11-14

## 2024-11-14 RX ORDER — TIMOLOL MALEATE 5 MG/ML
0.5 SOLUTION OPHTHALMIC TWICE DAILY
Refills: 0 | Status: ACTIVE | COMMUNITY
Start: 2024-11-14

## 2024-11-14 RX ORDER — LATANOPROST/PF 0.005 %
0.01 DROPS OPHTHALMIC (EYE)
Refills: 0 | Status: ACTIVE | COMMUNITY
Start: 2024-11-14

## 2024-11-19 ENCOUNTER — NON-APPOINTMENT (OUTPATIENT)
Age: 69
End: 2024-11-19

## 2024-11-20 ENCOUNTER — APPOINTMENT (OUTPATIENT)
Dept: CARDIOLOGY | Facility: CLINIC | Age: 69
End: 2024-11-20

## 2024-11-20 ENCOUNTER — NON-APPOINTMENT (OUTPATIENT)
Age: 69
End: 2024-11-20

## 2024-11-20 VITALS
BODY MASS INDEX: 29.35 KG/M2 | HEIGHT: 71.25 IN | DIASTOLIC BLOOD PRESSURE: 77 MMHG | SYSTOLIC BLOOD PRESSURE: 121 MMHG | OXYGEN SATURATION: 96 % | HEART RATE: 70 BPM | WEIGHT: 212 LBS

## 2024-11-20 DIAGNOSIS — R53.82 CHRONIC FATIGUE, UNSPECIFIED: ICD-10-CM

## 2024-11-20 DIAGNOSIS — I82.409 ACUTE EMBOLISM AND THROMBOSIS OF UNSPECIFIED DEEP VEINS OF UNSPECIFIED LOWER EXTREMITY: ICD-10-CM

## 2024-11-20 DIAGNOSIS — E78.5 HYPERLIPIDEMIA, UNSPECIFIED: ICD-10-CM

## 2024-11-20 DIAGNOSIS — R06.02 SHORTNESS OF BREATH: ICD-10-CM

## 2024-11-20 DIAGNOSIS — I26.99 OTHER PULMONARY EMBOLISM W/OUT ACUTE COR PULMONALE: ICD-10-CM

## 2024-11-20 PROCEDURE — 99215 OFFICE O/P EST HI 40 MIN: CPT

## 2024-11-20 RX ORDER — ATORVASTATIN CALCIUM 80 MG/1
80 TABLET, FILM COATED ORAL
Qty: 90 | Refills: 3 | Status: ACTIVE | COMMUNITY
Start: 2024-11-13 | End: 1900-01-01

## 2024-11-20 RX ORDER — APIXABAN 5 MG/1
5 TABLET, FILM COATED ORAL
Qty: 60 | Refills: 3 | Status: ACTIVE | COMMUNITY
Start: 2024-11-14 | End: 1900-01-01

## 2024-11-22 PROBLEM — I26.99 PULMONARY EMBOLISM, UNSPECIFIED CHRONICITY, UNSPECIFIED PULMONARY EMBOLISM TYPE, UNSPECIFIED WHETHER ACUTE COR PULMONALE PRESENT: Status: ACTIVE | Noted: 2024-11-20

## 2024-11-24 PROBLEM — I26.92 ACUTE SADDLE PULMONARY EMBOLISM, UNSPECIFIED WHETHER ACUTE COR PULMONALE PRESENT: Status: ACTIVE | Noted: 2024-11-24

## 2024-11-24 PROBLEM — I26.92 SADDLE PULMONARY EMBOLUS: Status: ACTIVE | Noted: 2024-11-24

## 2024-11-25 ENCOUNTER — RESULT REVIEW (OUTPATIENT)
Age: 69
End: 2024-11-25

## 2024-11-25 ENCOUNTER — OUTPATIENT (OUTPATIENT)
Dept: OUTPATIENT SERVICES | Facility: HOSPITAL | Age: 69
LOS: 1 days | Discharge: HOME | End: 2024-11-25

## 2024-11-25 ENCOUNTER — APPOINTMENT (OUTPATIENT)
Dept: HEMATOLOGY ONCOLOGY | Facility: CLINIC | Age: 69
End: 2024-11-25
Payer: MEDICARE

## 2024-11-25 ENCOUNTER — LABORATORY RESULT (OUTPATIENT)
Age: 69
End: 2024-11-25

## 2024-11-25 VITALS
RESPIRATION RATE: 16 BRPM | TEMPERATURE: 97.5 F | DIASTOLIC BLOOD PRESSURE: 77 MMHG | HEART RATE: 73 BPM | SYSTOLIC BLOOD PRESSURE: 122 MMHG

## 2024-11-25 DIAGNOSIS — I26.92 SADDLE EMBOLUS OF PULMONARY ARTERY W/OUT ACUTE COR PULMONALE: ICD-10-CM

## 2024-11-25 DIAGNOSIS — M20.12 HALLUX VALGUS (ACQUIRED), LEFT FOOT: Chronic | ICD-10-CM

## 2024-11-25 DIAGNOSIS — Z82.49 FAMILY HISTORY OF ISCHEMIC HEART DISEASE AND OTHER DISEASES OF THE CIRCULATORY SYSTEM: ICD-10-CM

## 2024-11-25 DIAGNOSIS — I82.409 ACUTE EMBOLISM AND THROMBOSIS OF UNSPECIFIED DEEP VEINS OF UNSPECIFIED LOWER EXTREMITY: ICD-10-CM

## 2024-11-25 DIAGNOSIS — Z83.3 FAMILY HISTORY OF DIABETES MELLITUS: ICD-10-CM

## 2024-11-25 LAB
BASOPHILS # BLD AUTO: 0.04 K/UL — SIGNIFICANT CHANGE UP (ref 0–0.2)
BASOPHILS NFR BLD AUTO: 0.5 % — SIGNIFICANT CHANGE UP (ref 0–2)
DEPRECATED D DIMER PPP IA-ACNC: 491 NG/ML DDU
EOSINOPHIL # BLD AUTO: 0.23 K/UL — SIGNIFICANT CHANGE UP (ref 0–0.5)
EOSINOPHIL NFR BLD AUTO: 3 % — SIGNIFICANT CHANGE UP (ref 0–6)
HCT VFR BLD CALC: 43.9 % — SIGNIFICANT CHANGE UP (ref 39–50)
HCYS SERPL-MCNC: 10.3 UMOL/L
HGB BLD-MCNC: 14.6 G/DL — SIGNIFICANT CHANGE UP (ref 13–17)
IMM GRANULOCYTES NFR BLD AUTO: 0.4 % — SIGNIFICANT CHANGE UP (ref 0–0.9)
LYMPHOCYTES # BLD AUTO: 2.61 K/UL — SIGNIFICANT CHANGE UP (ref 1–3.3)
LYMPHOCYTES # BLD AUTO: 34 % — SIGNIFICANT CHANGE UP (ref 13–44)
MCHC RBC-ENTMCNC: 33.3 G/DL — SIGNIFICANT CHANGE UP (ref 32–36)
MCHC RBC-ENTMCNC: 34.4 PG — HIGH (ref 27–34)
MCV RBC AUTO: 103.5 FL — HIGH (ref 80–100)
MONOCYTES # BLD AUTO: 0.75 K/UL — SIGNIFICANT CHANGE UP (ref 0–0.9)
MONOCYTES NFR BLD AUTO: 9.8 % — SIGNIFICANT CHANGE UP (ref 2–14)
NEUTROPHILS # BLD AUTO: 4.02 K/UL — SIGNIFICANT CHANGE UP (ref 1.8–7.4)
NEUTROPHILS NFR BLD AUTO: 52.3 % — SIGNIFICANT CHANGE UP (ref 43–77)
NRBC # BLD: 0 /100 WBCS — SIGNIFICANT CHANGE UP (ref 0–0)
NRBC BLD-RTO: 0 /100 WBCS — SIGNIFICANT CHANGE UP (ref 0–0)
PLATELET # BLD AUTO: 182 K/UL — SIGNIFICANT CHANGE UP (ref 150–400)
RBC # BLD: 4.24 M/UL — SIGNIFICANT CHANGE UP (ref 4.2–5.8)
RBC # FLD: 11.6 % — SIGNIFICANT CHANGE UP (ref 10.3–14.5)
WBC # BLD: 7.68 K/UL — SIGNIFICANT CHANGE UP (ref 3.8–10.5)
WBC # FLD AUTO: 7.68 K/UL — SIGNIFICANT CHANGE UP (ref 3.8–10.5)

## 2024-11-25 PROCEDURE — 99205 OFFICE O/P NEW HI 60 MIN: CPT

## 2024-11-26 ENCOUNTER — EMERGENCY (EMERGENCY)
Facility: HOSPITAL | Age: 69
LOS: 1 days | Discharge: HOME | End: 2024-11-26
Attending: EMERGENCY MEDICINE
Payer: MEDICARE

## 2024-11-26 VITALS
TEMPERATURE: 98 F | HEART RATE: 61 BPM | OXYGEN SATURATION: 96 % | DIASTOLIC BLOOD PRESSURE: 74 MMHG | RESPIRATION RATE: 16 BRPM | WEIGHT: 210.1 LBS | HEIGHT: 72 IN | SYSTOLIC BLOOD PRESSURE: 113 MMHG

## 2024-11-26 DIAGNOSIS — M20.12 HALLUX VALGUS (ACQUIRED), LEFT FOOT: Chronic | ICD-10-CM

## 2024-11-26 LAB
ADD ON TEST-SPECIMEN IN LAB: SIGNIFICANT CHANGE UP
ALBUMIN SERPL ELPH-MCNC: 3.9 G/DL — SIGNIFICANT CHANGE UP (ref 3.3–5)
ALP SERPL-CCNC: 70 U/L — SIGNIFICANT CHANGE UP (ref 40–120)
ALT FLD-CCNC: 26 U/L — SIGNIFICANT CHANGE UP (ref 10–45)
ANION GAP SERPL CALC-SCNC: 9 MMOL/L — SIGNIFICANT CHANGE UP (ref 5–17)
APTT BLD: 30.9 SEC — SIGNIFICANT CHANGE UP (ref 24.5–35.6)
AST SERPL-CCNC: 30 U/L — SIGNIFICANT CHANGE UP (ref 10–40)
AT III PPP CHRO-ACNC: 110 %
B2 GLYCOPROT1 IGA SERPL IA-ACNC: <2 U/ML
B2 GLYCOPROT1 IGG SER-ACNC: <1.4 U/ML
B2 GLYCOPROT1 IGM SER-ACNC: <1.5 U/ML
BASOPHILS # BLD AUTO: 0.02 K/UL — SIGNIFICANT CHANGE UP (ref 0–0.2)
BASOPHILS NFR BLD AUTO: 0.3 % — SIGNIFICANT CHANGE UP (ref 0–2)
BILIRUB SERPL-MCNC: 0.6 MG/DL — SIGNIFICANT CHANGE UP (ref 0.2–1.2)
BUN SERPL-MCNC: 10 MG/DL — SIGNIFICANT CHANGE UP (ref 7–23)
CALCIUM SERPL-MCNC: 9.3 MG/DL — SIGNIFICANT CHANGE UP (ref 8.4–10.5)
CARDIOLIPIN IGM SER-MCNC: <1.5 MPL U/ML
CARDIOLIPIN IGM SER-MCNC: <1.6 GPL U/ML
CHLORIDE SERPL-SCNC: 107 MMOL/L — SIGNIFICANT CHANGE UP (ref 96–108)
CO2 SERPL-SCNC: 24 MMOL/L — SIGNIFICANT CHANGE UP (ref 22–31)
CREAT SERPL-MCNC: 1.14 MG/DL — SIGNIFICANT CHANGE UP (ref 0.5–1.3)
EGFR: 70 ML/MIN/1.73M2 — SIGNIFICANT CHANGE UP
EOSINOPHIL # BLD AUTO: 0.18 K/UL — SIGNIFICANT CHANGE UP (ref 0–0.5)
EOSINOPHIL NFR BLD AUTO: 2.6 % — SIGNIFICANT CHANGE UP (ref 0–6)
GLUCOSE SERPL-MCNC: 84 MG/DL — SIGNIFICANT CHANGE UP (ref 70–99)
HCT VFR BLD CALC: 42.8 % — SIGNIFICANT CHANGE UP (ref 39–50)
HGB BLD-MCNC: 14.2 G/DL — SIGNIFICANT CHANGE UP (ref 13–17)
IMM GRANULOCYTES NFR BLD AUTO: 0.3 % — SIGNIFICANT CHANGE UP (ref 0–0.9)
INR BLD: 1.09 RATIO — SIGNIFICANT CHANGE UP (ref 0.85–1.16)
LYMPHOCYTES # BLD AUTO: 2.04 K/UL — SIGNIFICANT CHANGE UP (ref 1–3.3)
LYMPHOCYTES # BLD AUTO: 30 % — SIGNIFICANT CHANGE UP (ref 13–44)
MCHC RBC-ENTMCNC: 33.2 G/DL — SIGNIFICANT CHANGE UP (ref 32–36)
MCHC RBC-ENTMCNC: 33.3 PG — SIGNIFICANT CHANGE UP (ref 27–34)
MCV RBC AUTO: 100.2 FL — HIGH (ref 80–100)
MONOCYTES # BLD AUTO: 0.78 K/UL — SIGNIFICANT CHANGE UP (ref 0–0.9)
MONOCYTES NFR BLD AUTO: 11.5 % — SIGNIFICANT CHANGE UP (ref 2–14)
NEUTROPHILS # BLD AUTO: 3.77 K/UL — SIGNIFICANT CHANGE UP (ref 1.8–7.4)
NEUTROPHILS NFR BLD AUTO: 55.3 % — SIGNIFICANT CHANGE UP (ref 43–77)
NRBC # BLD: 0 /100 WBCS — SIGNIFICANT CHANGE UP (ref 0–0)
NT-PROBNP SERPL-SCNC: 96 PG/ML — SIGNIFICANT CHANGE UP (ref 0–300)
PLATELET # BLD AUTO: 195 K/UL — SIGNIFICANT CHANGE UP (ref 150–400)
POTASSIUM SERPL-MCNC: 4.4 MMOL/L — SIGNIFICANT CHANGE UP (ref 3.5–5.3)
POTASSIUM SERPL-SCNC: 4.4 MMOL/L — SIGNIFICANT CHANGE UP (ref 3.5–5.3)
PROT C PPP CHRO-ACNC: 71 %
PROT S AG ACT/NOR PPP IA: 91 %
PROT SERPL-MCNC: 6.8 G/DL — SIGNIFICANT CHANGE UP (ref 6–8.3)
PROTHROM AB SERPL-ACNC: 12.4 SEC — SIGNIFICANT CHANGE UP (ref 9.9–13.4)
RBC # BLD: 4.27 M/UL — SIGNIFICANT CHANGE UP (ref 4.2–5.8)
RBC # FLD: 11.8 % — SIGNIFICANT CHANGE UP (ref 10.3–14.5)
SODIUM SERPL-SCNC: 140 MMOL/L — SIGNIFICANT CHANGE UP (ref 135–145)
TROPONIN T, HIGH SENSITIVITY RESULT: 15 NG/L — SIGNIFICANT CHANGE UP (ref 0–51)
TROPONIN T, HIGH SENSITIVITY RESULT: 15 NG/L — SIGNIFICANT CHANGE UP (ref 0–51)
WBC # BLD: 6.81 K/UL — SIGNIFICANT CHANGE UP (ref 3.8–10.5)
WBC # FLD AUTO: 6.81 K/UL — SIGNIFICANT CHANGE UP (ref 3.8–10.5)

## 2024-11-26 PROCEDURE — 71045 X-RAY EXAM CHEST 1 VIEW: CPT | Mod: 26

## 2024-11-26 PROCEDURE — 99223 1ST HOSP IP/OBS HIGH 75: CPT

## 2024-11-26 RX ORDER — APIXABAN 5 MG/1
5 TABLET, FILM COATED ORAL
Refills: 0 | Status: ACTIVE | OUTPATIENT
Start: 2024-11-26 | End: 2025-10-25

## 2024-11-26 RX ORDER — TIMOLOL MALEATE 0.5 %
1 DROPS OPHTHALMIC (EYE)
Refills: 0 | Status: ACTIVE | OUTPATIENT
Start: 2024-11-26 | End: 2025-10-25

## 2024-11-26 RX ORDER — LATANOPROST 0.005 %
1 DROPS OPHTHALMIC (EYE) AT BEDTIME
Refills: 0 | Status: ACTIVE | OUTPATIENT
Start: 2024-11-26 | End: 2025-10-25

## 2024-11-26 RX ADMIN — Medication 80 MILLIGRAM(S): at 20:19

## 2024-11-26 RX ADMIN — Medication 1 DROP(S): at 20:19

## 2024-11-26 RX ADMIN — APIXABAN 5 MILLIGRAM(S): 5 TABLET, FILM COATED ORAL at 20:19

## 2024-11-26 NOTE — ED CDU PROVIDER DISPOSITION NOTE - ATTENDING APP SHARED VISIT CONTRIBUTION OF CARE
Tariq Giang MD, Attending Physician:     Summary: 69-year-old male with history of gout, glaucoma, recent admission in the first week of November for NSTEMI and saddle PE, treated with heparin, discharged on Eliquis and statin, RLE DVT, who was referred by visiting nurse for episode of lightheadedness and diaphoresis and shortness of breath with associated nausea and vomiting.  Patient has been compliant with Eliquis.    In the ED, patient feels well, stating he is just tired, had labs and imaging.  Labs nonactionable, troponin 15, 15, proBNP 96, chest x-ray with no focal consolidation.    Patient was placed in the CDU for further monitoring, frequent reassessments, Q4 hour vital signs, telemetry, echo, vascular cardiology consultation.    Echo showed enlarged RV cavity size, but normal systolic function.  Pulmonary pressures lower compared to prior TTE on 11/2/2024, RV function mildly improved and septal flattening is no longer seen.  LV systolic function normal, EF 60 to 65%.    Patient was evaluated by me in the morning, and reports improved symptoms, on examination, patient with stable vitals, well-appearing, in no acute distress. Cardiac examination RRR, lungs CTAB with no W/R/R.  Abdomen is soft and nontender.  There is no calf tenderness or leg swelling. Negative Shantell's sign.  Neurovascularly intact in all 4 extremities with 5/5 strength, normal sensation, equal pulses and brisk capillary refill, soft compartments.  Cranial nerves III-XII intact, no pronator drift, normal speech and gait.    Patient was seen by vascular cardiology.  They reviewed echo findings and saw improved RV function.  They do not believe patient's lightheadedness was related to prior VTE event.  They recommended that no further diagnostics including laboratory or imaging are needed, no indication for acute intervention, and patient appropriate for outpatient management at this time.    At 1620, the patient was reassessed and they state that their condition has improved. Stable vitals. Repeat neuro exam is benign without any neuro deficits. Repeat cardiovascular examination shows NRRR, equal pulses in all 4 extremities. Repeat pulmonary examination shows equal bilateral lung sounds. Repeat abdominal examination shows no tenderness, no peritoneal signs including rebound or guarding. The patient was able to eat, drink, and ambulate without assistance in the ED.    Stable for discharge with close follow-up and strict return precautions. Discussed the indications and side-effects of applicable medications.  Informed patient of all diagnostic test results including labs and imaging. The patient has been informed of all concerning signs and symptoms to return to Emergency Department, the necessity to follow up with PMD and cardiologist within 2-3 days was explained, or to return to the ED if unable to follow-up appropriately, and the patient reports understanding of above with capacity and insight.

## 2024-11-26 NOTE — ED CDU PROVIDER DISPOSITION NOTE - CLINICAL COURSE
69-year-old male past medical history of gout, glaucoma, recent admission first week of November for NSTEMI and saddle PE, treated with heparin and discharged on Eliquis and statin, RLE DVT presents to ED accompanied by wife referred by visiting nurse complaining of episode of lightheadedness and diaphoresis earlier this afternoon.  Patient said he felt slightly short of breath and when prompted by visiting nurse to take a few deep breaths symptoms started.  Endorsing episode of nausea and vomiting last night.  Denies any chest pain, jaw pain, radicular pain down arm.  Has been compliant with his Eliquis.  No abdominal pain.  No vision changes, extremity numbness or weakness.  	pt currently feels well, just tired. no other complaints. pt states he has been walking back and forth from bathroom in ED without issue, no sob/dyspnea.  	pt reports being treated with colchicine for acute gout but the swelling/pain to toe has resolved.  pt recently had cards evaluation outpatient last week- was told everything looked good and to f/up for CT in 3 months. pt also had appointment with hem/onc yesterday- no concerns.  In ED, labs nonactionable. pt sent to cdu for tele overnight, TTE in AM. will also contact [facility]-cards for evaluation. 69-year-old male past medical history of gout, glaucoma, recent admission first week of November for NSTEMI and saddle PE, treated with heparin and discharged on Eliquis and statin, RLE DVT presents to ED accompanied by wife referred by visiting nurse complaining of episode of lightheadedness and diaphoresis earlier this afternoon.  Patient said he felt slightly short of breath and when prompted by visiting nurse to take a few deep breaths symptoms started.  Endorsing episode of nausea and vomiting last night.  Denies any chest pain, jaw pain, radicular pain down arm.  Has been compliant with his Eliquis.  No abdominal pain.  No vision changes, extremity numbness or weakness.  	pt currently feels well, just tired. no other complaints. pt states he has been walking back and forth from bathroom in ED without issue, no sob/dyspnea.  	pt reports being treated with colchicine for acute gout but the swelling/pain to toe has resolved.  pt recently had cards evaluation outpatient last week- was told everything looked good and to f/up for CT in 3 months. pt also had appointment with hem/onc yesterday- no concerns.  In ED, labs non-actionable. pt sent to cdu for tele overnight, TTE in AM. will also contact [facility]-cards for evaluation. Patient has felt well while in the CDU without recurrent episodes of lightheaded sensation/nausea.  Echo resulted with RV function mildly improved from prior study during last admission.  Patient was seen by cardiology fellow who advised no further cardiac workup at this time.  Once confirmed with  cardiology attending plan to DC home 69-year-old male past medical history of gout, glaucoma, recent admission first week of November for NSTEMI and saddle PE, treated with heparin and discharged on Eliquis and statin, RLE DVT presents to ED accompanied by wife referred by visiting nurse complaining of episode of lightheadedness and diaphoresis earlier this afternoon.  Patient said he felt slightly short of breath and when prompted by visiting nurse to take a few deep breaths symptoms started.  Endorsing episode of nausea and vomiting last night.  Denies any chest pain, jaw pain, radicular pain down arm.  Has been compliant with his Eliquis.  No abdominal pain.  No vision changes, extremity numbness or weakness.  	pt currently feels well, just tired. no other complaints. pt states he has been walking back and forth from bathroom in ED without issue, no sob/dyspnea.  	pt reports being treated with colchicine for acute gout but the swelling/pain to toe has resolved.  pt recently had cards evaluation outpatient last week- was told everything looked good and to f/up for CT in 3 months. pt also had appointment with hem/onc yesterday- no concerns.  In ED, labs non-actionable. pt sent to cdu for tele overnight, TTE in AM. [facility]. Patient has felt well while in the CDU without recurrent episodes of lightheaded sensation/nausea.  Echo resulted with RV function mildly improved from prior study during last admission.  Patient was seen by cardiology fellow who advised no further cardiac workup at this time, plan confirmed with Cards attending Dr. Larios in regards to patient who advised from PE perspective everything is improved and advised pt can be dc home and she will arrange for outpatient follow up.

## 2024-11-26 NOTE — ED PROVIDER NOTE - ATTENDING APP SHARED VISIT CONTRIBUTION OF CARE
This is a 69-year-old gentleman with gout recently admitted to the hospital with pulmonary embolism with associated right heart strain.  He was discharged on oral anticoagulation and is taken all of his doses today for 1 dose that he missed 2 weeks ago.  He denies any bleeding from the rectum or anywhere else.  He feels like he is tired all day long and cannot get out of bed and his muscles are weak.  She has been since he was in the hospital.  No fevers chills cough chest pain shortness of breath labs  Awake alert talking no acute distress.  Regular rate and rhythm clear lungs.  Patient is able to sit upright and move his upper and lower extremities.  He could only do this with additional effort.  Assistance is not absolutely required.  Unclear the cause of this patient's weakness however it seems like it may be cardiac or muscular post hospitalization.  For now we will do EKG basic blood work including CBC CMP and coags.  Check a CPK and cardiac enzymes.  Given his recent echo results it may be worthwhile to admit him to see if his heart strain has worsened and this is the cause of his weakness.  Also it may be colchicine as that was also started during the course of his hospitalization however if that was the case I would assume it would be because an GIBRAN had increased his blood level of colchicine as he is only taking a low dose now.

## 2024-11-26 NOTE — ED ADULT NURSE NOTE - OBJECTIVE STATEMENT
70 y/o M complaining of dizziness/lightheaded episode. Pt states their home nurse asked him to take deep breaths, Pt then experienced lightheaded/dizziness for about 5 minutes with diaphoresis. Pt states they also had an episode of nausea and emesis last night but denies fevers, chills, sick contact. Pt was discharged from the hospital 11/1 for DVT/PE and started on eliquis. Pt dyspneic on exertion at baseline. Denies chest pain, headache, abdominal pain, diarrhea, constipation, dysuria. AAOx3, ambulatory at baseline. Breathing spontaneous and unlabored. Abdomen soft, nondistended, and mild tenderness in lower quadrants. Skin warm, dry, and normal for race.

## 2024-11-26 NOTE — ED CDU PROVIDER INITIAL DAY NOTE - OBJECTIVE STATEMENT
69-year-old male past medical history of gout, glaucoma, recent admission first week of November for NSTEMI and saddle PE, treated with heparin and discharged on Eliquis and statin, RLE DVT presents to ED accompanied by wife referred by visiting nurse complaining of episode of lightheadedness and diaphoresis earlier this afternoon.  Patient said he felt slightly short of breath and when prompted by visiting nurse to take a few deep breaths symptoms started.  Endorsing episode of nausea and vomiting last night.  Denies any chest pain, jaw pain, radicular pain down arm.  Has been compliant with his Eliquis.  No abdominal pain.  No vision changes, extremity numbness or weakness.  pt currently feels well, just tired. no other complaints. pt states he has been walking back and forth from bathroom in ED without issue, no sob/dyspnea.  pt reports being treated with colchicine for acute gout but the swelling/pain to toe has resolved.  pt recently had cards evaluation outpatient last week- was told everything looked good and to f/up for CT in 3 months. pt also had appointment with hem/onc yesterday- no concerns.

## 2024-11-26 NOTE — ED PROVIDER NOTE - PHYSICAL EXAMINATION
GEN: Pt fatigued, non-toxic in NAD, alert.  PSYCH: Affect and mood appropriate.  EYES: Sclera white w/o injection, EOMI, PERRLA.   ENT: Neck supple. Airway patent.  RESP: CTA b/l.  CARDIAC: RRR, S1, S2, no M/G/R.  ABD: Soft, NT.  MSK: LEWIS spontaneously.  NEURO: Nonfocal.  VASC: Strong distal pulses. No pitting edema.  SKIN: No rashes or lesions.

## 2024-11-26 NOTE — ED CDU PROVIDER DISPOSITION NOTE - NSFOLLOWUPINSTRUCTIONS_ED_ALL_ED_FT
1. Stay hydrated.  2. Continue Current Home Medications  3. Follow up with your PCP in 1-2 days. Follow up with your Cardiologist next week. (Bring printed results to your doctor visit).  4. Return if symptoms, worsen, fever, weakness, chest pain, difficulty breathing, dizziness and all other concerns. 1. Please follow up with your Primary Care Doctor after discharge, bring a copy of your results to follow up appointment for review     2. Please rest, stay hydrated and continue all at home medications as previously prescribed    3.  Follow up with Cardiology after discharge for reevaluation and continued management. Please see office information below to call and schedule appointment:       Preethi Abbasi    Interventional Cardiology    [address] [place] [id number]    Phone: [phone]    Fax: [phone]    4. Return if symptoms, worsen, fever, weakness, chest pain, difficulty breathing, dizziness and all other concerns.

## 2024-11-26 NOTE — ED CDU PROVIDER DISPOSITION NOTE - PATIENT PORTAL LINK FT
You can access the FollowMyHealth Patient Portal offered by [facility] by registering at the following website: [email or web address]. By joining [facility]’s FollowMyHealth portal, you will also be able to view your health information using other applications (apps) compatible with our system.

## 2024-11-26 NOTE — ED CDU PROVIDER INITIAL DAY NOTE - ATTENDING APP SHARED VISIT CONTRIBUTION OF CARE
ATTENDING, Mak STEPHENSON: I have personally performed a face to face diagnostic evaluation on this patient.  I have reviewed the ACP note and agree with the history, exam, and plan of care, except as noted here. Progress notes and further evaluation to be reviewed by observation and discharging attending.

## 2024-11-26 NOTE — ED ADULT NURSE NOTE - NSFALLLASTSIX_ED_ALL_ED
No. I have personally evaluated and examined the patient. The Attending was available to me as a supervising provider if needed.

## 2024-11-26 NOTE — ED PROVIDER NOTE - OBJECTIVE STATEMENT
69-year-old male past medical history of gout, glaucoma, recent admission first week of November for NSTEMI and saddle PE, treated with heparin and discharged on Eliquis and statin, presents to ED accompanied by wife referred by visiting nurse complaining of episode of lightheadedness and diaphoresis earlier this afternoon.  Patient said he felt slightly short of breath and when prompted by visiting nurse to take a few deep breaths symptoms started.  Endorsing episode of nausea and vomiting last night.  Denies any chest pain, jaw pain, radicular pain down arm.  Has been compliant with his Eliquis.  No abdominal pain.  No vision changes, extremity numbness or weakness.

## 2024-11-26 NOTE — ED PROVIDER NOTE - PROGRESS NOTE DETAILS
Pilo Ward PA-C: ER workup nonactionable.  Given recent saddle PE with right heart strain will place in CDU for telemonitoring and TTE for reevaluation of heart strain.  Patient seen and excepted by CDU PA.

## 2024-11-27 ENCOUNTER — RESULT REVIEW (OUTPATIENT)
Age: 69
End: 2024-11-27

## 2024-11-27 VITALS
DIASTOLIC BLOOD PRESSURE: 81 MMHG | TEMPERATURE: 98 F | SYSTOLIC BLOOD PRESSURE: 123 MMHG | HEART RATE: 63 BPM | OXYGEN SATURATION: 98 % | RESPIRATION RATE: 18 BRPM

## 2024-11-27 LAB
DNA PLOIDY SPEC FC-IMP: NORMAL
PTR INTERP: NORMAL

## 2024-11-27 PROCEDURE — 93325 DOPPLER ECHO COLOR FLOW MAPG: CPT | Mod: 26

## 2024-11-27 PROCEDURE — 93308 TTE F-UP OR LMTD: CPT

## 2024-11-27 PROCEDURE — 82550 ASSAY OF CK (CPK): CPT

## 2024-11-27 PROCEDURE — 93325 DOPPLER ECHO COLOR FLOW MAPG: CPT

## 2024-11-27 PROCEDURE — 99285 EMERGENCY DEPT VISIT HI MDM: CPT | Mod: 25

## 2024-11-27 PROCEDURE — 80053 COMPREHEN METABOLIC PANEL: CPT

## 2024-11-27 PROCEDURE — 83880 ASSAY OF NATRIURETIC PEPTIDE: CPT

## 2024-11-27 PROCEDURE — 99223 1ST HOSP IP/OBS HIGH 75: CPT

## 2024-11-27 PROCEDURE — 36415 COLL VENOUS BLD VENIPUNCTURE: CPT

## 2024-11-27 PROCEDURE — 71045 X-RAY EXAM CHEST 1 VIEW: CPT

## 2024-11-27 PROCEDURE — 93356 MYOCRD STRAIN IMG SPCKL TRCK: CPT

## 2024-11-27 PROCEDURE — 85025 COMPLETE CBC W/AUTO DIFF WBC: CPT

## 2024-11-27 PROCEDURE — 85730 THROMBOPLASTIN TIME PARTIAL: CPT

## 2024-11-27 PROCEDURE — 93308 TTE F-UP OR LMTD: CPT | Mod: 26

## 2024-11-27 PROCEDURE — 93321 DOPPLER ECHO F-UP/LMTD STD: CPT | Mod: 26

## 2024-11-27 PROCEDURE — 84484 ASSAY OF TROPONIN QUANT: CPT

## 2024-11-27 PROCEDURE — 99239 HOSP IP/OBS DSCHRG MGMT >30: CPT

## 2024-11-27 PROCEDURE — 93005 ELECTROCARDIOGRAM TRACING: CPT

## 2024-11-27 PROCEDURE — G0378: CPT

## 2024-11-27 PROCEDURE — 93321 DOPPLER ECHO F-UP/LMTD STD: CPT

## 2024-11-27 PROCEDURE — 85610 PROTHROMBIN TIME: CPT

## 2024-11-27 RX ADMIN — Medication 1 DROP(S): at 07:56

## 2024-11-27 RX ADMIN — APIXABAN 5 MILLIGRAM(S): 5 TABLET, FILM COATED ORAL at 07:56

## 2024-11-27 NOTE — ED CDU PROVIDER SUBSEQUENT DAY NOTE - PROGRESS NOTE DETAILS
Patient has felt well while in the CDU without recurrent episodes of lightheaded sensation/nausea.  Echo resulted with RV function mildly improved from prior study during last admission.  Patient was seen by cardiology fellow who advised no further cardiac workup at this time.  Once confirmed with  cardiology attending plan to DC home  Kait Portillo PA-C Spoke to cardiology attending Dr. Larios in regards to patient who advised from PE perspective everything is improved and advised pt can be dc home and she will arrange for outpatient follow up. Will dc   Kait Portillo PA-C

## 2024-11-27 NOTE — ED ADULT NURSE REASSESSMENT NOTE - NS ED NURSE REASSESS COMMENT FT1
Pt received from MIGUEL Jon. Pt oriented to CDU & plan of care was discussed. Pt A&O x 4. Independent. Pt in CDU for telemetry and TTE . Pt denies any chest pain, SOB, dizziness or lightheadedness at this time. V/S stable, pt afebrile,  IV in place, patent and free of signs of infiltration. Pt resting in bed. Safety & comfort measures maintained. Call bell in reach. Care continues.
Pt received from MIGUEL Wesley. Pt oriented to CDU & plan of care was discussed. Pt A&O x 4. Pt in CDU for frequent reeval, vitals per routine, tele, echo, vasc cards consult. Pt denies any chest pain, SOB, dizziness or palpitations as of now. Pt on a cardiac monitor in NSR, HR in 60's.  V/S stable, pt afebrile,  IV in place, patent and free of signs of infiltration. Pt resting in bed. Safety & comfort measures maintained. Call bell in reach. Will continue to monitor.
07.00 Received the Pt from  MIGUEL Kraus Pt is Observed for lightheadedness  for ECHO. Received the Pt A&OX 4  Pt obeys commands Joyce N/V/D fever chills cp SOB   Comfort care & safety measures continued  IV site looks clean & dry no signs of infiltration noted pt denies  pain IV site .Pt is oriented to the unit Plan of care explained .  Pt is advised to call for help  call bell with in the reach pt verbalized the understanding .  pending CDU  MD archer . GCS 15/15 A&OX 4 PERRLA  size 3 Strong upper & lower extremities steady gait  Pt is ambulatory & independent   No facial droop  No Hand Leg drop denies numbness tingling  Plan of care ongoing

## 2024-11-27 NOTE — ED CDU PROVIDER SUBSEQUENT DAY NOTE - HISTORY
CDU PROGRESS NOTE JENNIFER DENTON: No interval change. resting comfortably, NAD. VSS. no events on tele. pending TTE in am. Will continue to monitor.

## 2024-11-27 NOTE — CONSULT NOTE ADULT - SUBJECTIVE AND OBJECTIVE BOX
Vascular Cardiology Consult Note     DIRECT PROVIDER NUMBER: [phone]  / Available on TEAMS    CC: PE    HPI:    69M w/ hx of gout, glaucoma, recent admission for saddle PE (intermediate high risk) and RLE DVT, who is presenting with light-headedness. The pt was recently admitted from 11/1-11/7 w/ dizziness/near syncope, found to have saddle PE (intermediate high risk) and RLE DVT. Treated w/ heparin gtt and transitioned to eliquis at discharge. Was seen by heme during the admission with negative APLS labs.    Pt reports on day of presentation was being seen by visiting nurse and receiving vital signs when he felt sudden onset light-headedness that subsequently resolved. Denies associated CP, SOB, complete syncope. Has been taking Eliquis as prescribed.      Allergies    No Known Allergies    Intolerances    	    MEDICATIONS:  apixaban 5 milliGRAM(s) Oral two times a day            atorvastatin 80 milliGRAM(s) Oral at bedtime    latanoprost 0.005% Ophthalmic Solution 1 Drop(s) Right EYE at bedtime  timolol 0.5% Solution 1 Drop(s) Right EYE two times a day      PAST MEDICAL & SURGICAL HISTORY:  Bunion of Great Toe  h/oLEFT      Bunion of great toe  right      Gout      Seasonal allergies      Glaucoma      History of Vasectomy  6/2011      Bunion of great toe of left foot  2011          FAMILY HISTORY:  FH: congestive heart failure (Father)        SOCIAL HISTORY:  unchanged        PHYSICAL EXAM:  T(C): 36.6 (11-27-24 @ 11:01), Max: 36.8 (11-26-24 @ 18:40)  HR: 59 (11-27-24 @ 11:01) (58 - 68)  BP: 131/85 (11-27-24 @ 11:01) (113/74 - 151/91)  RR: 18 (11-27-24 @ 11:01) (16 - 18)  SpO2: 98% (11-27-24 @ 11:01) (96% - 100%)  Wt(kg): --  I&O's Summary      General: NAD, lying in hospital bed, breathing comfortably on RA  HEENT: NC, AT, EOMI, PERRL  CV: RRR, clear S1/S2, no m/r/g  Pulm: CTAB, no w/c/r  Abd: soft, NTND, +BS  Ext: WWP, no LE swelling, pulses intact    LABS:	 	    CBC Full  -  ( 26 Nov 2024 15:52 )  WBC Count : 6.81 K/uL  Hemoglobin : 14.2 g/dL  Hematocrit : 42.8 %  Platelet Count - Automated : 195 K/uL  Mean Cell Volume : 100.2 fl  Mean Cell Hemoglobin : 33.3 pg  Mean Cell Hemoglobin Concentration : 33.2 g/dL  Auto Neutrophil # : 3.77 K/uL  Auto Lymphocyte # : 2.04 K/uL  Auto Monocyte # : 0.78 K/uL  Auto Eosinophil # : 0.18 K/uL  Auto Basophil # : 0.02 K/uL  Auto Neutrophil % : 55.3 %  Auto Lymphocyte % : 30.0 %  Auto Monocyte % : 11.5 %  Auto Eosinophil % : 2.6 %  Auto Basophil % : 0.3 %    11-26    140  |  107  |  10  ----------------------------<  84  4.4   |  24  |  1.14    Ca    9.3      26 Nov 2024 15:52    TPro  6.8  /  Alb  3.9  /  TBili  0.6  /  DBili  x   /  AST  30  /  ALT  26  /  AlkPhos  70  11-26      Assessment:  1. Saddle pulmonary embolism (last admission) on CTA 11/2/24  2. RLE DVT (last admission) on duplex 11/2/24       Plan:  - this presentation, pt on room air, without tachycardia, now asymptomatic, trop-hs negative x2, pro-BNP negative, TTE with improving RV function  - overall not suggestive of new or worsening VTE, etiology of pt's transient light-headedness likely unrelated to pt's prior VTE event  - agree with continuing apixaban 5mg BID  - no further cardiac work-up at this time    NOTE AND RECS INCOMPLETE       Richard Canseco MD  Cardiology Fellow, PGY-6    Please call with any questions:   DIRECT SERVICE NUMBER: [phone] / Available on TEAMS Vascular Cardiology Consult Note     DIRECT PROVIDER NUMBER: [phone]  / Available on TEAMS    CC: PE    HPI:    69M w/ hx of gout, glaucoma, recent admission for saddle PE (intermediate high risk) and RLE DVT, who is presenting with light-headedness. The pt was recently admitted from 11/1-11/7 w/ dizziness/near syncope, found to have saddle PE (intermediate high risk) and RLE DVT. Treated w/ heparin gtt and transitioned to eliquis at discharge. Was seen by heme during the admission with negative APLS labs.    Pt reports on day of presentation was being seen by visiting nurse and receiving vital signs when he felt sudden onset light-headedness that subsequently resolved. Denies associated CP, SOB, complete syncope. Has been taking Eliquis as prescribed.      Allergies    No Known Allergies    Intolerances    	    MEDICATIONS:  apixaban 5 milliGRAM(s) Oral two times a day            atorvastatin 80 milliGRAM(s) Oral at bedtime    latanoprost 0.005% Ophthalmic Solution 1 Drop(s) Right EYE at bedtime  timolol 0.5% Solution 1 Drop(s) Right EYE two times a day      PAST MEDICAL & SURGICAL HISTORY:  Bunion of Great Toe  h/oLEFT      Bunion of great toe  right      Gout      Seasonal allergies      Glaucoma      History of Vasectomy  6/2011      Bunion of great toe of left foot  2011          FAMILY HISTORY:  FH: congestive heart failure (Father)        SOCIAL HISTORY:  unchanged        PHYSICAL EXAM:  T(C): 36.6 (11-27-24 @ 11:01), Max: 36.8 (11-26-24 @ 18:40)  HR: 59 (11-27-24 @ 11:01) (58 - 68)  BP: 131/85 (11-27-24 @ 11:01) (113/74 - 151/91)  RR: 18 (11-27-24 @ 11:01) (16 - 18)  SpO2: 98% (11-27-24 @ 11:01) (96% - 100%)  Wt(kg): --  I&O's Summary      General: NAD, lying in hospital bed, breathing comfortably on RA  HEENT: NC, AT, EOMI, PERRL  CV: RRR, clear S1/S2, no m/r/g  Pulm: CTAB, no w/c/r  Abd: soft, NTND, +BS  Ext: WWP, no LE swelling, pulses intact    LABS:	 	    CBC Full  -  ( 26 Nov 2024 15:52 )  WBC Count : 6.81 K/uL  Hemoglobin : 14.2 g/dL  Hematocrit : 42.8 %  Platelet Count - Automated : 195 K/uL  Mean Cell Volume : 100.2 fl  Mean Cell Hemoglobin : 33.3 pg  Mean Cell Hemoglobin Concentration : 33.2 g/dL  Auto Neutrophil # : 3.77 K/uL  Auto Lymphocyte # : 2.04 K/uL  Auto Monocyte # : 0.78 K/uL  Auto Eosinophil # : 0.18 K/uL  Auto Basophil # : 0.02 K/uL  Auto Neutrophil % : 55.3 %  Auto Lymphocyte % : 30.0 %  Auto Monocyte % : 11.5 %  Auto Eosinophil % : 2.6 %  Auto Basophil % : 0.3 %    11-26    140  |  107  |  10  ----------------------------<  84  4.4   |  24  |  1.14    Ca    9.3      26 Nov 2024 15:52    TPro  6.8  /  Alb  3.9  /  TBili  0.6  /  DBili  x   /  AST  30  /  ALT  26  /  AlkPhos  70  11-26      Assessment:  1. Saddle pulmonary embolism (last admission) on CTA 11/2/24  2. RLE DVT (last admission) on duplex 11/2/24       Plan:  - this presentation, pt on room air, without tachycardia, now asymptomatic, trop-hs negative x2, pro-BNP negative, TTE with improving RV function  - overall not suggestive of new or worsening VTE, etiology of pt's transient light-headedness likely unrelated to pt's prior VTE event  - agree with continuing apixaban 5mg BID  - no further cardiac work-up at this time       Richard Canseco MD  Cardiology Fellow, PGY-6    Please call with any questions:   DIRECT SERVICE NUMBER: [phone] / Available on TEAMS

## 2024-11-27 NOTE — ED CDU PROVIDER SUBSEQUENT DAY NOTE - ATTENDING APP SHARED VISIT CONTRIBUTION OF CARE
Tariq Giang MD, Attending Physician:     Summary: 69-year-old male with history of gout, glaucoma, recent admission in the first week of November for NSTEMI and saddle PE, treated with heparin, discharged on Eliquis and statin, RLE DVT, who was referred by visiting nurse for episode of lightheadedness and diaphoresis and shortness of breath with associated nausea and vomiting.  Patient has been compliant with Eliquis.    In the ED, patient feels well, stating he is just tired, had labs and imaging.  Labs nonactionable, troponin 15, 15, proBNP 96, chest x-ray with no focal consolidation.    Patient was placed in the CDU for further monitoring, frequent reassessments, Q4 hour vital signs, telemetry, echo, vascular cardiology consultation.    Echo showed enlarged RV cavity size, but normal systolic function.  Pulmonary pressures lower compared to prior TTE on 11/2/2024, RV function mildly improved and septal flattening is no longer seen.  LV systolic function normal, EF 60 to 65%.    Patient was evaluated by me in the morning, and reports improved symptoms, on examination, patient with stable vitals, well-appearing, in no acute distress. Cardiac examination RRR, lungs CTAB with no W/R/R.  Abdomen is soft and nontender.  There is no calf tenderness or leg swelling. Negative Shantell's sign.  Neurovascularly intact in all 4 extremities with 5/5 strength, normal sensation, equal pulses and brisk capillary refill, soft compartments.  Cranial nerves III-XII intact, no pronator drift, normal speech and gait.. Tariq Giang MD, Attending Physician:     Summary: 69-year-old male with history of gout, glaucoma, recent admission in the first week of November for NSTEMI and saddle PE, treated with heparin, discharged on Eliquis and statin, RLE DVT, who was referred by visiting nurse for episode of lightheadedness and diaphoresis and shortness of breath with associated nausea and vomiting.  Patient has been compliant with Eliquis.    In the ED, patient feels well, stating he is just tired, had labs and imaging.  Labs nonactionable, troponin 15, 15, proBNP 96, chest x-ray with no focal consolidation.    Patient was placed in the CDU for further monitoring, frequent reassessments, Q4 hour vital signs, telemetry, echo, vascular cardiology consultation.    Echo showed enlarged RV cavity size, but normal systolic function.  Pulmonary pressures lower compared to prior TTE on 11/2/2024, RV function mildly improved and septal flattening is no longer seen.  LV systolic function normal, EF 60 to 65%.    Patient was evaluated by me in the morning, and reports improved symptoms, on examination, patient with stable vitals, well-appearing, in no acute distress. Cardiac examination RRR, lungs CTAB with no W/R/R.  Abdomen is soft and nontender.  There is no calf tenderness or leg swelling. Negative Shantell's sign.  Neurovascularly intact in all 4 extremities with 5/5 strength, normal sensation, equal pulses and brisk capillary refill, soft compartments.  Cranial nerves III-XII intact, no pronator drift, normal speech and gait.    Patient was seen by vascular cardiology.  They reviewed echo findings and saw improved RV function.  They do not believe patient's lightheadedness was related to prior VTE event.  They recommended that no further diagnostics including laboratory or imaging are needed, no indication for acute intervention, and patient appropriate for outpatient management at this time.    At 1600, the patient was reassessed and they state that their condition has improved. Stable vitals. Repeat neuro exam is benign without any neuro deficits. Repeat cardiovascular examination shows NRRR, equal pulses in all 4 extremities. Repeat pulmonary examination shows equal bilateral lung sounds. Repeat abdominal examination shows no tenderness, no peritoneal signs including rebound or guarding. The patient was able to eat, drink, and ambulate without assistance in the ED. Tariq Giang MD, Attending Physician:     Summary: 69-year-old male with history of gout, glaucoma, recent admission in the first week of November for NSTEMI and saddle PE, treated with heparin, discharged on Eliquis and statin, RLE DVT, who was referred by visiting nurse for episode of lightheadedness and diaphoresis and shortness of breath with associated nausea and vomiting.  Patient has been compliant with Eliquis.    In the ED, patient feels well, stating he is just tired, had labs and imaging.  Labs nonactionable, troponin 15, 15, proBNP 96, chest x-ray with no focal consolidation.    Patient was placed in the CDU for further monitoring, frequent reassessments, Q4 hour vital signs, telemetry, echo, vascular cardiology consultation.    Echo showed enlarged RV cavity size, but normal systolic function.  Pulmonary pressures lower compared to prior TTE on 11/2/2024, RV function mildly improved and septal flattening is no longer seen.  LV systolic function normal, EF 60 to 65%.    Patient was evaluated by me in the morning, and reports improved symptoms, on examination, patient with stable vitals, well-appearing, in no acute distress. Cardiac examination RRR, lungs CTAB with no W/R/R.  Abdomen is soft and nontender.  There is no calf tenderness or leg swelling. Negative Shantell's sign.  Neurovascularly intact in all 4 extremities with 5/5 strength, normal sensation, equal pulses and brisk capillary refill, soft compartments.  Cranial nerves III-XII intact, no pronator drift, normal speech and gait.    Patient was seen by vascular cardiology.  They reviewed echo findings and saw improved RV function.  They do not believe patient's lightheadedness was related to prior VTE event.  They recommended that no further diagnostics including laboratory or imaging are needed, no indication for acute intervention, and patient appropriate for outpatient management at this time.    At 1620, the patient was reassessed and they state that their condition has improved. Stable vitals. Repeat neuro exam is benign without any neuro deficits. Repeat cardiovascular examination shows NRRR, equal pulses in all 4 extremities. Repeat pulmonary examination shows equal bilateral lung sounds. Repeat abdominal examination shows no tenderness, no peritoneal signs including rebound or guarding. The patient was able to eat, drink, and ambulate without assistance in the ED. Tariq Giang MD, Attending Physician:     Summary: 69-year-old male with history of gout, glaucoma, recent admission in the first week of November for NSTEMI and saddle PE, treated with heparin, discharged on Eliquis and statin, RLE DVT, who was referred by visiting nurse for episode of lightheadedness and diaphoresis and shortness of breath with associated nausea and vomiting.  Patient has been compliant with Eliquis.    In the ED, patient feels well, stating he is just tired, had labs and imaging.  Labs nonactionable, troponin 15, 15, proBNP 96, chest x-ray with no focal consolidation.    Patient was placed in the CDU for further monitoring, frequent reassessments, Q4 hour vital signs, telemetry, echo, vascular cardiology consultation.    Echo showed enlarged RV cavity size, but normal systolic function.  Pulmonary pressures lower compared to prior TTE on 11/2/2024, RV function mildly improved and septal flattening is no longer seen.  LV systolic function normal, EF 60 to 65%.    Patient was evaluated by me in the morning, and reports improved symptoms, on examination, patient with stable vitals, well-appearing, in no acute distress. Cardiac examination RRR, lungs CTAB with no W/R/R.  Abdomen is soft and nontender.  There is no calf tenderness or leg swelling. Negative Shantell's sign.  Neurovascularly intact in all 4 extremities with 5/5 strength, normal sensation, equal pulses and brisk capillary refill, soft compartments.  Cranial nerves III-XII intact, no pronator drift, normal speech and gait.    Patient was seen by vascular cardiology.  They reviewed echo findings and saw improved RV function.  They do not believe patient's lightheadedness was related to prior VTE event.  They recommended that no further diagnostics including laboratory or imaging are needed, no indication for acute intervention, and patient appropriate for outpatient management at this time.    At 1620, the patient was reassessed and they state that their condition has improved. Stable vitals. Repeat neuro exam is benign without any neuro deficits. Repeat cardiovascular examination shows NRRR, equal pulses in all 4 extremities. Repeat pulmonary examination shows equal bilateral lung sounds. Repeat abdominal examination shows no tenderness, no peritoneal signs including rebound or guarding. The patient was able to eat, drink, and ambulate without assistance in the ED.    Stable for discharge with close follow-up and strict return precautions. Discussed the indications and side-effects of applicable medications.  Informed patient of all diagnostic test results including labs and imaging. The patient has been informed of all concerning signs and symptoms to return to Emergency Department, the necessity to follow up with PMD and cardiologist within 2-3 days was explained, or to return to the ED if unable to follow-up appropriately, and the patient reports understanding of above with capacity and insight.

## 2024-11-28 LAB — PROT S FREE PPP-ACNC: 79 %

## 2024-12-02 ENCOUNTER — NON-APPOINTMENT (OUTPATIENT)
Age: 69
End: 2024-12-02

## 2024-12-04 ENCOUNTER — APPOINTMENT (OUTPATIENT)
Dept: CARDIOLOGY | Facility: CLINIC | Age: 69
End: 2024-12-04
Payer: MEDICARE

## 2024-12-04 VITALS
SYSTOLIC BLOOD PRESSURE: 129 MMHG | BODY MASS INDEX: 29.26 KG/M2 | WEIGHT: 209 LBS | DIASTOLIC BLOOD PRESSURE: 85 MMHG | HEART RATE: 70 BPM | OXYGEN SATURATION: 98 % | HEIGHT: 71 IN

## 2024-12-04 DIAGNOSIS — I82.409 ACUTE EMBOLISM AND THROMBOSIS OF UNSPECIFIED DEEP VEINS OF UNSPECIFIED LOWER EXTREMITY: ICD-10-CM

## 2024-12-04 DIAGNOSIS — I26.99 OTHER PULMONARY EMBOLISM W/OUT ACUTE COR PULMONALE: ICD-10-CM

## 2024-12-04 PROCEDURE — G2211 COMPLEX E/M VISIT ADD ON: CPT

## 2024-12-04 PROCEDURE — 99215 OFFICE O/P EST HI 40 MIN: CPT

## 2025-01-22 ENCOUNTER — LABORATORY RESULT (OUTPATIENT)
Age: 70
End: 2025-01-22

## 2025-01-22 ENCOUNTER — APPOINTMENT (OUTPATIENT)
Dept: INTERNAL MEDICINE | Facility: CLINIC | Age: 70
End: 2025-01-22

## 2025-01-22 ENCOUNTER — OUTPATIENT (OUTPATIENT)
Dept: OUTPATIENT SERVICES | Facility: HOSPITAL | Age: 70
LOS: 1 days | End: 2025-01-22

## 2025-01-22 VITALS
OXYGEN SATURATION: 98 % | HEIGHT: 71 IN | BODY MASS INDEX: 30.8 KG/M2 | WEIGHT: 220 LBS | DIASTOLIC BLOOD PRESSURE: 90 MMHG | HEART RATE: 65 BPM | SYSTOLIC BLOOD PRESSURE: 130 MMHG

## 2025-01-22 DIAGNOSIS — I26.92 SADDLE EMBOLUS OF PULMONARY ARTERY W/OUT ACUTE COR PULMONALE: ICD-10-CM

## 2025-01-22 DIAGNOSIS — Z00.00 ENCOUNTER FOR GENERAL ADULT MEDICAL EXAMINATION W/OUT ABNORMAL FINDINGS: ICD-10-CM

## 2025-01-22 DIAGNOSIS — Z86.718 PERSONAL HISTORY OF OTHER VENOUS THROMBOSIS AND EMBOLISM: ICD-10-CM

## 2025-01-22 DIAGNOSIS — M20.12 HALLUX VALGUS (ACQUIRED), LEFT FOOT: Chronic | ICD-10-CM

## 2025-01-22 DIAGNOSIS — S12.490A: ICD-10-CM

## 2025-01-22 DIAGNOSIS — M54.41 LUMBAGO WITH SCIATICA, RIGHT SIDE: ICD-10-CM

## 2025-01-22 DIAGNOSIS — M54.50 LOW BACK PAIN, UNSPECIFIED: ICD-10-CM

## 2025-01-22 DIAGNOSIS — I21.4 NON-ST ELEVATION (NSTEMI) MYOCARDIAL INFARCTION: ICD-10-CM

## 2025-01-22 DIAGNOSIS — Z80.0 FAMILY HISTORY OF MALIGNANT NEOPLASM OF DIGESTIVE ORGANS: ICD-10-CM

## 2025-01-22 DIAGNOSIS — Z13.820 ENCOUNTER FOR SCREENING FOR OSTEOPOROSIS: ICD-10-CM

## 2025-01-22 DIAGNOSIS — Z87.39 PERSONAL HISTORY OF OTHER DISEASES OF THE MUSCULOSKELETAL SYSTEM AND CONNECTIVE TISSUE: ICD-10-CM

## 2025-01-22 DIAGNOSIS — Z82.3 FAMILY HISTORY OF STROKE: ICD-10-CM

## 2025-01-22 DIAGNOSIS — Z78.9 OTHER SPECIFIED HEALTH STATUS: ICD-10-CM

## 2025-01-22 DIAGNOSIS — Z91.89 OTHER SPECIFIED PERSONAL RISK FACTORS, NOT ELSEWHERE CLASSIFIED: ICD-10-CM

## 2025-01-22 DIAGNOSIS — I26.99 OTHER PULMONARY EMBOLISM W/OUT ACUTE COR PULMONALE: ICD-10-CM

## 2025-01-22 LAB
ALBUMIN SERPL ELPH-MCNC: 4.5 G/DL
ALP BLD-CCNC: 88 U/L
ALT SERPL-CCNC: 26 U/L
ANION GAP SERPL CALC-SCNC: 11 MMOL/L
AST SERPL-CCNC: 24 U/L
BILIRUB SERPL-MCNC: 0.6 MG/DL
BUN SERPL-MCNC: 10 MG/DL
CALCIUM SERPL-MCNC: 9.5 MG/DL
CHLORIDE SERPL-SCNC: 106 MMOL/L
CHOLEST SERPL-MCNC: 139 MG/DL
CO2 SERPL-SCNC: 26 MMOL/L
CREAT SERPL-MCNC: 1.2 MG/DL
EGFR: 66 ML/MIN/1.73M2
GLUCOSE SERPL-MCNC: 91 MG/DL
HDLC SERPL-MCNC: 69 MG/DL
LDLC SERPL CALC-MCNC: 55 MG/DL
NONHDLC SERPL-MCNC: 70 MG/DL
POTASSIUM SERPL-SCNC: 4.3 MMOL/L
PROT SERPL-MCNC: 7.4 G/DL
SODIUM SERPL-SCNC: 143 MMOL/L
TRIGL SERPL-MCNC: 75 MG/DL
TSH SERPL-ACNC: 1.29 UIU/ML

## 2025-01-22 PROCEDURE — G0439: CPT

## 2025-01-22 PROCEDURE — G0438: CPT

## 2025-01-23 LAB
BASOPHILS # BLD AUTO: 0.03 K/UL
BASOPHILS NFR BLD AUTO: 0.3 %
EOSINOPHIL # BLD AUTO: 0.16 K/UL
EOSINOPHIL NFR BLD AUTO: 1.8 %
ESTIMATED AVERAGE GLUCOSE: 103 MG/DL
HBA1C MFR BLD HPLC: 5.2 %
HCT VFR BLD CALC: 46.2 %
HCV AB SER QL: NONREACTIVE
HCV S/CO RATIO: 0.1 S/CO
HGB BLD-MCNC: 14.9 G/DL
HIV1+2 AB SPEC QL IA.RAPID: NONREACTIVE
IMM GRANULOCYTES NFR BLD AUTO: 0.3 %
LYMPHOCYTES # BLD AUTO: 2.49 K/UL
LYMPHOCYTES NFR BLD AUTO: 28.2 %
MAN DIFF?: NORMAL
MCHC RBC-ENTMCNC: 32.3 G/DL
MCHC RBC-ENTMCNC: 34.1 PG
MCV RBC AUTO: 105.7 FL
MONOCYTES # BLD AUTO: 0.74 K/UL
MONOCYTES NFR BLD AUTO: 8.4 %
NEUTROPHILS # BLD AUTO: 5.38 K/UL
NEUTROPHILS NFR BLD AUTO: 61 %
PLATELET # BLD AUTO: 188 K/UL
RBC # BLD: 4.37 M/UL
RBC # FLD: 12.3 %
T PALLIDUM AB SER QL IA: NEGATIVE
WBC # FLD AUTO: 8.83 K/UL

## 2025-01-29 ENCOUNTER — OUTPATIENT (OUTPATIENT)
Dept: OUTPATIENT SERVICES | Facility: HOSPITAL | Age: 70
LOS: 1 days | End: 2025-01-29
Payer: MEDICARE

## 2025-01-29 ENCOUNTER — APPOINTMENT (OUTPATIENT)
Dept: ULTRASOUND IMAGING | Facility: IMAGING CENTER | Age: 70
End: 2025-01-29
Payer: MEDICARE

## 2025-01-29 DIAGNOSIS — M20.12 HALLUX VALGUS (ACQUIRED), LEFT FOOT: Chronic | ICD-10-CM

## 2025-01-29 DIAGNOSIS — I82.409 ACUTE EMBOLISM AND THROMBOSIS OF UNSPECIFIED DEEP VEINS OF UNSPECIFIED LOWER EXTREMITY: ICD-10-CM

## 2025-01-29 PROBLEM — Z91.89 GUNS IN HOME STORED IN LOCKED CABINET: Status: ACTIVE | Noted: 2025-01-29

## 2025-01-29 PROBLEM — Z82.3 FAMILY HISTORY OF STROKE: Status: ACTIVE | Noted: 2025-01-29

## 2025-01-29 PROBLEM — I26.92 SADDLE PULMONARY EMBOLUS: Status: RESOLVED | Noted: 2024-11-24 | Resolved: 2025-01-29

## 2025-01-29 PROBLEM — Z87.39 HISTORY OF JOINT PAIN: Status: RESOLVED | Noted: 2018-07-24 | Resolved: 2025-01-29

## 2025-01-29 PROBLEM — Z86.718 HISTORY OF DEEP VENOUS THROMBOSIS: Status: RESOLVED | Noted: 2024-11-14 | Resolved: 2025-01-29

## 2025-01-29 PROBLEM — Z87.39 HISTORY OF JOINT STIFFNESS: Status: RESOLVED | Noted: 2018-07-24 | Resolved: 2025-01-29

## 2025-01-29 PROBLEM — Z80.0 FAMILY HISTORY OF COLON CANCER: Status: ACTIVE | Noted: 2025-01-29

## 2025-01-29 PROBLEM — S12.490A: Status: RESOLVED | Noted: 2019-07-29 | Resolved: 2025-01-29

## 2025-01-29 PROBLEM — I21.4 NSTEMI, INITIAL EPISODE OF CARE: Status: RESOLVED | Noted: 2024-11-14 | Resolved: 2025-01-29

## 2025-01-29 PROBLEM — I26.99 PULMONARY EMBOLISM, UNSPECIFIED CHRONICITY, UNSPECIFIED PULMONARY EMBOLISM TYPE, UNSPECIFIED WHETHER ACUTE COR PULMONALE PRESENT: Noted: 2024-11-20

## 2025-01-29 PROBLEM — Z13.820 OSTEOPOROSIS SCREENING: Status: RESOLVED | Noted: 2019-08-01 | Resolved: 2025-01-29

## 2025-01-29 PROBLEM — M54.50 LUMBAGO: Noted: 2018-09-20

## 2025-01-29 PROBLEM — I26.92 ACUTE SADDLE PULMONARY EMBOLISM, UNSPECIFIED WHETHER ACUTE COR PULMONALE PRESENT: Status: RESOLVED | Noted: 2024-11-24 | Resolved: 2025-01-29

## 2025-01-29 PROCEDURE — 93970 EXTREMITY STUDY: CPT | Mod: 26

## 2025-01-29 PROCEDURE — 93970 EXTREMITY STUDY: CPT

## 2025-01-31 DIAGNOSIS — I26.92 SADDLE EMBOLUS OF PULMONARY ARTERY WITHOUT ACUTE COR PULMONALE: ICD-10-CM

## 2025-01-31 DIAGNOSIS — Z00.00 ENCOUNTER FOR GENERAL ADULT MEDICAL EXAMINATION WITHOUT ABNORMAL FINDINGS: ICD-10-CM

## 2025-02-10 ENCOUNTER — OUTPATIENT (OUTPATIENT)
Dept: OUTPATIENT SERVICES | Facility: HOSPITAL | Age: 70
LOS: 1 days | End: 2025-02-10
Payer: MEDICARE

## 2025-02-10 ENCOUNTER — RESULT REVIEW (OUTPATIENT)
Age: 70
End: 2025-02-10

## 2025-02-10 ENCOUNTER — APPOINTMENT (OUTPATIENT)
Dept: CV DIAGNOSITCS | Facility: HOSPITAL | Age: 70
End: 2025-02-10

## 2025-02-10 DIAGNOSIS — M20.12 HALLUX VALGUS (ACQUIRED), LEFT FOOT: Chronic | ICD-10-CM

## 2025-02-10 DIAGNOSIS — I26.99 OTHER PULMONARY EMBOLISM WITHOUT ACUTE COR PULMONALE: ICD-10-CM

## 2025-02-10 PROCEDURE — 93356 MYOCRD STRAIN IMG SPCKL TRCK: CPT

## 2025-02-10 PROCEDURE — 93306 TTE W/DOPPLER COMPLETE: CPT | Mod: 26

## 2025-02-10 PROCEDURE — 93306 TTE W/DOPPLER COMPLETE: CPT

## 2025-02-12 ENCOUNTER — NON-APPOINTMENT (OUTPATIENT)
Age: 70
End: 2025-02-12

## 2025-02-12 ENCOUNTER — APPOINTMENT (OUTPATIENT)
Dept: CARDIOLOGY | Facility: CLINIC | Age: 70
End: 2025-02-12
Payer: MEDICARE

## 2025-02-12 VITALS — OXYGEN SATURATION: 98 % | HEART RATE: 63 BPM | DIASTOLIC BLOOD PRESSURE: 82 MMHG | SYSTOLIC BLOOD PRESSURE: 127 MMHG

## 2025-02-12 DIAGNOSIS — I82.409 ACUTE EMBOLISM AND THROMBOSIS OF UNSPECIFIED DEEP VEINS OF UNSPECIFIED LOWER EXTREMITY: ICD-10-CM

## 2025-02-12 DIAGNOSIS — I87.2 VENOUS INSUFFICIENCY (CHRONIC) (PERIPHERAL): ICD-10-CM

## 2025-02-12 DIAGNOSIS — I26.99 OTHER PULMONARY EMBOLISM W/OUT ACUTE COR PULMONALE: ICD-10-CM

## 2025-02-12 PROCEDURE — G2211 COMPLEX E/M VISIT ADD ON: CPT

## 2025-02-12 PROCEDURE — 99215 OFFICE O/P EST HI 40 MIN: CPT

## 2025-02-12 PROCEDURE — 93000 ELECTROCARDIOGRAM COMPLETE: CPT

## 2025-03-05 ENCOUNTER — OUTPATIENT (OUTPATIENT)
Dept: OUTPATIENT SERVICES | Facility: HOSPITAL | Age: 70
LOS: 1 days | End: 2025-03-05

## 2025-03-05 ENCOUNTER — APPOINTMENT (OUTPATIENT)
Dept: INTERNAL MEDICINE | Facility: CLINIC | Age: 70
End: 2025-03-05
Payer: MEDICARE

## 2025-03-05 VITALS
BODY MASS INDEX: 31.22 KG/M2 | OXYGEN SATURATION: 97 % | HEIGHT: 71 IN | SYSTOLIC BLOOD PRESSURE: 155 MMHG | WEIGHT: 223 LBS | HEART RATE: 61 BPM | DIASTOLIC BLOOD PRESSURE: 90 MMHG

## 2025-03-05 VITALS — SYSTOLIC BLOOD PRESSURE: 158 MMHG | DIASTOLIC BLOOD PRESSURE: 88 MMHG

## 2025-03-05 DIAGNOSIS — R20.2 PARESTHESIA OF SKIN: ICD-10-CM

## 2025-03-05 DIAGNOSIS — I10 ESSENTIAL (PRIMARY) HYPERTENSION: ICD-10-CM

## 2025-03-05 DIAGNOSIS — M20.12 HALLUX VALGUS (ACQUIRED), LEFT FOOT: Chronic | ICD-10-CM

## 2025-03-05 DIAGNOSIS — M25.552 PAIN IN RIGHT HIP: ICD-10-CM

## 2025-03-05 DIAGNOSIS — R71.8 OTHER ABNORMALITY OF RED BLOOD CELLS: ICD-10-CM

## 2025-03-05 DIAGNOSIS — Z23 ENCOUNTER FOR IMMUNIZATION: ICD-10-CM

## 2025-03-05 DIAGNOSIS — M25.551 PAIN IN RIGHT HIP: ICD-10-CM

## 2025-03-05 DIAGNOSIS — M70.61 TROCHANTERIC BURSITIS, RIGHT HIP: ICD-10-CM

## 2025-03-05 DIAGNOSIS — Z87.898 PERSONAL HISTORY OF OTHER SPECIFIED CONDITIONS: ICD-10-CM

## 2025-03-05 DIAGNOSIS — M25.571 PAIN IN RIGHT ANKLE AND JOINTS OF RIGHT FOOT: ICD-10-CM

## 2025-03-05 PROCEDURE — 99214 OFFICE O/P EST MOD 30 MIN: CPT

## 2025-03-05 PROCEDURE — G2211 COMPLEX E/M VISIT ADD ON: CPT

## 2025-03-05 RX ORDER — AMLODIPINE BESYLATE 2.5 MG/1
2.5 TABLET ORAL
Qty: 30 | Refills: 1 | Status: ACTIVE | COMMUNITY
Start: 2025-03-05 | End: 1900-01-01

## 2025-03-05 RX ORDER — CHLORTHALIDONE 25 MG/1
25 TABLET ORAL DAILY
Qty: 15 | Refills: 1 | Status: ACTIVE | COMMUNITY
Start: 2025-03-05 | End: 1900-01-01

## 2025-03-07 DIAGNOSIS — I10 ESSENTIAL (PRIMARY) HYPERTENSION: ICD-10-CM

## 2025-04-16 ENCOUNTER — APPOINTMENT (OUTPATIENT)
Dept: INTERNAL MEDICINE | Facility: CLINIC | Age: 70
End: 2025-04-16

## 2025-05-27 ENCOUNTER — OUTPATIENT (OUTPATIENT)
Dept: OUTPATIENT SERVICES | Facility: HOSPITAL | Age: 70
LOS: 1 days | Discharge: HOME | End: 2025-05-27

## 2025-05-27 DIAGNOSIS — I82.409 ACUTE EMBOLISM AND THROMBOSIS OF UNSPECIFIED DEEP VEINS OF UNSPECIFIED LOWER EXTREMITY: ICD-10-CM

## 2025-05-27 DIAGNOSIS — M20.12 HALLUX VALGUS (ACQUIRED), LEFT FOOT: Chronic | ICD-10-CM

## 2025-06-02 ENCOUNTER — APPOINTMENT (OUTPATIENT)
Dept: HEMATOLOGY ONCOLOGY | Facility: CLINIC | Age: 70
End: 2025-06-02
Payer: MEDICARE

## 2025-06-02 ENCOUNTER — LABORATORY RESULT (OUTPATIENT)
Age: 70
End: 2025-06-02

## 2025-06-02 VITALS
BODY MASS INDEX: 30.9 KG/M2 | DIASTOLIC BLOOD PRESSURE: 75 MMHG | OXYGEN SATURATION: 97 % | RESPIRATION RATE: 16 BRPM | SYSTOLIC BLOOD PRESSURE: 117 MMHG | WEIGHT: 221.56 LBS | HEART RATE: 65 BPM | TEMPERATURE: 97.9 F

## 2025-06-02 DIAGNOSIS — I26.99 OTHER PULMONARY EMBOLISM W/OUT ACUTE COR PULMONALE: ICD-10-CM

## 2025-06-02 PROCEDURE — 99215 OFFICE O/P EST HI 40 MIN: CPT

## 2025-06-03 LAB
DEPRECATED D DIMER PPP IA-ACNC: 341 NG/ML DDU
PROT C PPP CHRO-ACNC: 71 %
PROT S AG ACT/NOR PPP IA: 89 %

## 2025-06-05 LAB — PROT S FREE PPP-ACNC: 64 %

## 2025-06-18 ENCOUNTER — APPOINTMENT (OUTPATIENT)
Dept: CARDIOLOGY | Facility: CLINIC | Age: 70
End: 2025-06-18
Payer: MEDICARE

## 2025-06-18 ENCOUNTER — NON-APPOINTMENT (OUTPATIENT)
Age: 70
End: 2025-06-18

## 2025-06-18 VITALS
HEART RATE: 55 BPM | OXYGEN SATURATION: 98 % | SYSTOLIC BLOOD PRESSURE: 128 MMHG | DIASTOLIC BLOOD PRESSURE: 87 MMHG | WEIGHT: 212 LBS | BODY MASS INDEX: 29.57 KG/M2

## 2025-06-18 PROCEDURE — G2211 COMPLEX E/M VISIT ADD ON: CPT

## 2025-06-18 PROCEDURE — 99215 OFFICE O/P EST HI 40 MIN: CPT

## 2025-06-18 PROCEDURE — 93000 ELECTROCARDIOGRAM COMPLETE: CPT

## 2025-07-06 NOTE — ED CDU PROVIDER INITIAL DAY NOTE - NEUROLOGICAL, MLM
64-year-old male with history of hypertension, diabetes, hyperlipidemia, CAD status post stents, PVD, on Brilinta, presents to the ED complaining of left lower extremity pain.  States she has pain that radiates from behind the knee posteriorly up the leg to the buttock and groin.  Describes the pain as a pulling sharp pain . No associated back pain.  States pain worsens when he ambulates and improves with rest.  Denies associated numbness or weakness of the leg.  Patient had a recent angioplasty of the left anterior tibial artery 2 weeks ago.  Patient was instructed to go to the ED by his cardiologist for further evaluation.  On exam pt has palpable pulses, warm feet bilaterally and CTA does not reveal any significant occlusions. Patient denies recent injuries, falls or other trauma, heavy lifting, or new exercises.  Pt has chronic back pain for which he is on oxycodone. Pt denies bowel or bladder changes, denies numbness or weakness, denies inability to ambulate. Pt denies fever, chills, chest pain, shortness of breath, abdominal pain, nausea, vomiting, diarrhea.  No saddle anesthesia or urinary retention/incontinence. In the ED his A1C was found to be 12, he was formerly on Ozempic which he states he ran out of and lost a significant amount of weight while taking. Pt states he was given Jardiance but had UTI's while on the medication, pt states he is currently taking Metformin for DM but states he has not been taking it every day. Pt was sent to CDU for endocrinology consultation, glycemic control, reassessment.  Pt states Pt denies any other sx or acute complaints. Pt was sent to CDU for endocrinology consultation, management of poorly controlled DM with acute hyperglycemia, PRN control of his leg pain, reassessment. Low clinical suspicion but a LE duplex was also ordered to r/o a DVT, pt has no CP, no SOB. Alert and oriented. m/s intact x 4.

## 2025-09-10 ENCOUNTER — APPOINTMENT (OUTPATIENT)
Dept: INTERNAL MEDICINE | Facility: CLINIC | Age: 70
End: 2025-09-10
Payer: MEDICARE

## 2025-09-10 VITALS
OXYGEN SATURATION: 95 % | HEIGHT: 71 IN | DIASTOLIC BLOOD PRESSURE: 90 MMHG | WEIGHT: 221 LBS | HEART RATE: 66 BPM | BODY MASS INDEX: 30.94 KG/M2 | SYSTOLIC BLOOD PRESSURE: 110 MMHG

## 2025-09-10 VITALS — SYSTOLIC BLOOD PRESSURE: 138 MMHG | DIASTOLIC BLOOD PRESSURE: 96 MMHG

## 2025-09-10 DIAGNOSIS — I21.4 NON-ST ELEVATION (NSTEMI) MYOCARDIAL INFARCTION: ICD-10-CM

## 2025-09-10 DIAGNOSIS — Z86.718 PERSONAL HISTORY OF OTHER VENOUS THROMBOSIS AND EMBOLISM: ICD-10-CM

## 2025-09-10 DIAGNOSIS — R71.8 OTHER ABNORMALITY OF RED BLOOD CELLS: ICD-10-CM

## 2025-09-10 DIAGNOSIS — Z80.0 FAMILY HISTORY OF MALIGNANT NEOPLASM OF DIGESTIVE ORGANS: ICD-10-CM

## 2025-09-10 DIAGNOSIS — Z12.11 ENCOUNTER FOR SCREENING FOR MALIGNANT NEOPLASM OF COLON: ICD-10-CM

## 2025-09-10 DIAGNOSIS — S82.832D OTHER FRACTURE OF UPPER AND LOWER END OF LEFT FIBULA, SUBSEQUENT ENCOUNTER FOR CLOSED FRACTURE WITH ROUTINE HEALING: ICD-10-CM

## 2025-09-10 DIAGNOSIS — I10 ESSENTIAL (PRIMARY) HYPERTENSION: ICD-10-CM

## 2025-09-10 DIAGNOSIS — M1A.0710 IDIOPATHIC CHRONIC GOUT, RIGHT ANKLE AND FOOT, W/OUT TOPHUS (TOPHI): ICD-10-CM

## 2025-09-10 PROCEDURE — G2211 COMPLEX E/M VISIT ADD ON: CPT

## 2025-09-10 PROCEDURE — 99213 OFFICE O/P EST LOW 20 MIN: CPT

## 2025-09-10 RX ORDER — ALLOPURINOL 300 MG/1
300 TABLET ORAL DAILY
Qty: 90 | Refills: 0 | Status: ACTIVE | COMMUNITY
Start: 2025-09-10 | End: 1900-01-01

## 2025-09-12 ENCOUNTER — RX RENEWAL (OUTPATIENT)
Age: 70
End: 2025-09-12